# Patient Record
Sex: FEMALE | Race: WHITE | NOT HISPANIC OR LATINO | Employment: OTHER | URBAN - NONMETROPOLITAN AREA
[De-identification: names, ages, dates, MRNs, and addresses within clinical notes are randomized per-mention and may not be internally consistent; named-entity substitution may affect disease eponyms.]

---

## 2023-03-25 PROBLEM — M85.80 OSTEOPENIA: Status: ACTIVE | Noted: 2023-03-25

## 2023-03-25 PROBLEM — E78.5 HYPERLIPIDEMIA: Status: ACTIVE | Noted: 2023-03-25

## 2023-03-25 PROBLEM — R51.9 HEADACHE: Status: ACTIVE | Noted: 2023-03-25

## 2023-03-25 PROBLEM — E66.3 OVERWEIGHT WITH BODY MASS INDEX (BMI) OF 29 TO 29.9 IN ADULT: Status: ACTIVE | Noted: 2023-03-25

## 2023-03-25 PROBLEM — H90.3 SENSORINEURAL HEARING LOSS, ASYMMETRICAL: Status: ACTIVE | Noted: 2023-03-25

## 2023-03-25 PROBLEM — H93.292 AUDITORY DISCRIMINATION IMPAIRMENT, LEFT: Status: ACTIVE | Noted: 2023-03-25

## 2023-03-25 PROBLEM — H90.3 SENSORINEURAL HEARING LOSS (SNHL) OF BOTH EARS: Status: ACTIVE | Noted: 2023-03-25

## 2023-03-25 PROBLEM — R53.83 FATIGUE: Status: ACTIVE | Noted: 2023-03-25

## 2023-03-25 PROBLEM — R19.7 DIARRHEA: Status: ACTIVE | Noted: 2023-03-25

## 2023-03-25 PROBLEM — M17.9 DJD (DEGENERATIVE JOINT DISEASE) OF KNEE: Status: ACTIVE | Noted: 2023-03-25

## 2023-03-25 PROBLEM — I10 HYPERTENSION: Status: ACTIVE | Noted: 2023-03-25

## 2023-03-25 PROBLEM — H93.293 AUDITORY DISCRIMINATION IMPAIRMENT, BILATERAL: Status: ACTIVE | Noted: 2023-03-25

## 2023-03-25 PROBLEM — M17.12 PRIMARY OSTEOARTHRITIS OF LEFT KNEE: Status: ACTIVE | Noted: 2023-03-25

## 2023-03-25 PROBLEM — L23.7 POISON IVY: Status: ACTIVE | Noted: 2023-03-25

## 2023-03-25 PROBLEM — E03.9 HYPOTHYROIDISM (ACQUIRED): Status: ACTIVE | Noted: 2023-03-25

## 2023-03-25 PROBLEM — J02.9 SORE THROAT: Status: ACTIVE | Noted: 2023-03-25

## 2023-03-25 PROBLEM — M17.11 PRIMARY OSTEOARTHRITIS OF RIGHT KNEE: Status: ACTIVE | Noted: 2023-03-25

## 2023-03-25 PROBLEM — J30.9 ALLERGIC RHINITIS: Status: ACTIVE | Noted: 2023-03-25

## 2023-03-25 RX ORDER — ACETAMINOPHEN 500 MG
1000 TABLET ORAL
COMMUNITY
Start: 2019-09-09

## 2023-03-25 RX ORDER — CHOLECALCIFEROL (VITAMIN D3) 125 MCG
1 CAPSULE ORAL DAILY
COMMUNITY
Start: 2016-10-19

## 2023-03-25 RX ORDER — LISINOPRIL 10 MG/1
1 TABLET ORAL DAILY
COMMUNITY
Start: 2020-02-17 | End: 2023-03-30 | Stop reason: SDUPTHER

## 2023-03-25 RX ORDER — EPINEPHRINE 0.3 MG/.3ML
0.3 INJECTION SUBCUTANEOUS
COMMUNITY
Start: 2013-03-25 | End: 2023-03-30 | Stop reason: SDUPTHER

## 2023-03-25 RX ORDER — CETIRIZINE HYDROCHLORIDE 10 MG/1
1 TABLET ORAL DAILY
COMMUNITY
Start: 2018-11-10 | End: 2023-06-19

## 2023-03-25 RX ORDER — TRIAMCINOLONE ACETONIDE 1 MG/G
CREAM TOPICAL 2 TIMES DAILY
COMMUNITY
Start: 2021-06-16 | End: 2023-10-26 | Stop reason: ALTCHOICE

## 2023-03-25 RX ORDER — LEVOTHYROXINE SODIUM 50 UG/1
1 TABLET ORAL DAILY
COMMUNITY
Start: 2013-03-25 | End: 2023-03-30 | Stop reason: SDUPTHER

## 2023-03-25 RX ORDER — ATORVASTATIN CALCIUM 20 MG/1
1 TABLET, FILM COATED ORAL DAILY
COMMUNITY
End: 2023-03-30 | Stop reason: SDUPTHER

## 2023-03-30 ENCOUNTER — OFFICE VISIT (OUTPATIENT)
Dept: PRIMARY CARE | Facility: CLINIC | Age: 83
End: 2023-03-30
Payer: MEDICARE

## 2023-03-30 ENCOUNTER — LAB (OUTPATIENT)
Dept: LAB | Facility: LAB | Age: 83
End: 2023-03-30
Payer: MEDICARE

## 2023-03-30 VITALS
SYSTOLIC BLOOD PRESSURE: 138 MMHG | HEIGHT: 61 IN | DIASTOLIC BLOOD PRESSURE: 78 MMHG | RESPIRATION RATE: 14 BRPM | HEART RATE: 65 BPM | WEIGHT: 154.8 LBS | TEMPERATURE: 97.7 F | OXYGEN SATURATION: 98 % | BODY MASS INDEX: 29.23 KG/M2

## 2023-03-30 DIAGNOSIS — E66.3 OVERWEIGHT WITH BODY MASS INDEX (BMI) OF 29 TO 29.9 IN ADULT: ICD-10-CM

## 2023-03-30 DIAGNOSIS — J30.1 SEASONAL ALLERGIC RHINITIS DUE TO POLLEN: ICD-10-CM

## 2023-03-30 DIAGNOSIS — I10 PRIMARY HYPERTENSION: ICD-10-CM

## 2023-03-30 DIAGNOSIS — Z00.00 ROUTINE GENERAL MEDICAL EXAMINATION AT HEALTH CARE FACILITY: ICD-10-CM

## 2023-03-30 DIAGNOSIS — H35.3212 EXUDATIVE AGE-RELATED MACULAR DEGENERATION OF RIGHT EYE WITH INACTIVE CHOROIDAL NEOVASCULARIZATION (MULTI): ICD-10-CM

## 2023-03-30 DIAGNOSIS — E03.9 HYPOTHYROIDISM (ACQUIRED): ICD-10-CM

## 2023-03-30 DIAGNOSIS — E03.9 HYPOTHYROIDISM, UNSPECIFIED TYPE: ICD-10-CM

## 2023-03-30 DIAGNOSIS — I10 PRIMARY HYPERTENSION: Primary | ICD-10-CM

## 2023-03-30 DIAGNOSIS — E78.2 MIXED HYPERLIPIDEMIA: ICD-10-CM

## 2023-03-30 DIAGNOSIS — Z13.89 SCREENING FOR MULTIPLE CONDITIONS: ICD-10-CM

## 2023-03-30 DIAGNOSIS — M17.12 PRIMARY OSTEOARTHRITIS OF LEFT KNEE: ICD-10-CM

## 2023-03-30 PROBLEM — H35.3122 INTERMEDIATE STAGE NONEXUDATIVE AGE-RELATED MACULAR DEGENERATION OF LEFT EYE: Status: ACTIVE | Noted: 2023-03-30

## 2023-03-30 PROBLEM — R51.9 HEADACHE: Status: RESOLVED | Noted: 2023-03-25 | Resolved: 2023-03-30

## 2023-03-30 PROBLEM — J02.9 SORE THROAT: Status: RESOLVED | Noted: 2023-03-25 | Resolved: 2023-03-30

## 2023-03-30 PROBLEM — L23.7 POISON IVY: Status: RESOLVED | Noted: 2023-03-25 | Resolved: 2023-03-30

## 2023-03-30 PROBLEM — R19.7 DIARRHEA: Status: RESOLVED | Noted: 2023-03-25 | Resolved: 2023-03-30

## 2023-03-30 PROBLEM — R53.83 FATIGUE: Status: RESOLVED | Noted: 2023-03-25 | Resolved: 2023-03-30

## 2023-03-30 LAB
ALANINE AMINOTRANSFERASE (SGPT) (U/L) IN SER/PLAS: 14 U/L (ref 7–45)
ALBUMIN (G/DL) IN SER/PLAS: 4.5 G/DL (ref 3.4–5)
ALKALINE PHOSPHATASE (U/L) IN SER/PLAS: 137 U/L (ref 33–136)
ANION GAP IN SER/PLAS: 13 MMOL/L (ref 10–20)
ASPARTATE AMINOTRANSFERASE (SGOT) (U/L) IN SER/PLAS: 16 U/L (ref 9–39)
BASOPHILS (10*3/UL) IN BLOOD BY AUTOMATED COUNT: 0.09 X10E9/L (ref 0–0.1)
BASOPHILS/100 LEUKOCYTES IN BLOOD BY AUTOMATED COUNT: 1.6 % (ref 0–2)
BILIRUBIN TOTAL (MG/DL) IN SER/PLAS: 0.7 MG/DL (ref 0–1.2)
CALCIUM (MG/DL) IN SER/PLAS: 9.6 MG/DL (ref 8.6–10.6)
CARBON DIOXIDE, TOTAL (MMOL/L) IN SER/PLAS: 28 MMOL/L (ref 21–32)
CHLORIDE (MMOL/L) IN SER/PLAS: 106 MMOL/L (ref 98–107)
CHOLESTEROL (MG/DL) IN SER/PLAS: 184 MG/DL (ref 0–199)
CHOLESTEROL IN HDL (MG/DL) IN SER/PLAS: 62.1 MG/DL
CHOLESTEROL/HDL RATIO: 3
CREATININE (MG/DL) IN SER/PLAS: 0.81 MG/DL (ref 0.5–1.05)
EOSINOPHILS (10*3/UL) IN BLOOD BY AUTOMATED COUNT: 0.31 X10E9/L (ref 0–0.4)
EOSINOPHILS/100 LEUKOCYTES IN BLOOD BY AUTOMATED COUNT: 5.6 % (ref 0–6)
ERYTHROCYTE DISTRIBUTION WIDTH (RATIO) BY AUTOMATED COUNT: 12.8 % (ref 11.5–14.5)
ERYTHROCYTE MEAN CORPUSCULAR HEMOGLOBIN CONCENTRATION (G/DL) BY AUTOMATED: 32.4 G/DL (ref 32–36)
ERYTHROCYTE MEAN CORPUSCULAR VOLUME (FL) BY AUTOMATED COUNT: 95 FL (ref 80–100)
ERYTHROCYTES (10*6/UL) IN BLOOD BY AUTOMATED COUNT: 4.79 X10E12/L (ref 4–5.2)
GFR FEMALE: 72 ML/MIN/1.73M2
GLUCOSE (MG/DL) IN SER/PLAS: 91 MG/DL (ref 74–99)
HEMATOCRIT (%) IN BLOOD BY AUTOMATED COUNT: 45.4 % (ref 36–46)
HEMOGLOBIN (G/DL) IN BLOOD: 14.7 G/DL (ref 12–16)
IMMATURE GRANULOCYTES/100 LEUKOCYTES IN BLOOD BY AUTOMATED COUNT: 0.2 % (ref 0–0.9)
LDL: 99 MG/DL (ref 0–99)
LEUKOCYTES (10*3/UL) IN BLOOD BY AUTOMATED COUNT: 5.5 X10E9/L (ref 4.4–11.3)
LYMPHOCYTES (10*3/UL) IN BLOOD BY AUTOMATED COUNT: 1.53 X10E9/L (ref 0.8–3)
LYMPHOCYTES/100 LEUKOCYTES IN BLOOD BY AUTOMATED COUNT: 27.8 % (ref 13–44)
MONOCYTES (10*3/UL) IN BLOOD BY AUTOMATED COUNT: 0.52 X10E9/L (ref 0.05–0.8)
MONOCYTES/100 LEUKOCYTES IN BLOOD BY AUTOMATED COUNT: 9.5 % (ref 2–10)
NEUTROPHILS (10*3/UL) IN BLOOD BY AUTOMATED COUNT: 3.04 X10E9/L (ref 1.6–5.5)
NEUTROPHILS/100 LEUKOCYTES IN BLOOD BY AUTOMATED COUNT: 55.3 % (ref 40–80)
NRBC (PER 100 WBCS) BY AUTOMATED COUNT: 0 /100 WBC (ref 0–0)
PLATELETS (10*3/UL) IN BLOOD AUTOMATED COUNT: 199 X10E9/L (ref 150–450)
POTASSIUM (MMOL/L) IN SER/PLAS: 4.1 MMOL/L (ref 3.5–5.3)
PROTEIN TOTAL: 6.7 G/DL (ref 6.4–8.2)
SODIUM (MMOL/L) IN SER/PLAS: 143 MMOL/L (ref 136–145)
THYROTROPIN (MIU/L) IN SER/PLAS BY DETECTION LIMIT <= 0.05 MIU/L: 3.29 MIU/L (ref 0.44–3.98)
TRIGLYCERIDE (MG/DL) IN SER/PLAS: 116 MG/DL (ref 0–149)
UREA NITROGEN (MG/DL) IN SER/PLAS: 20 MG/DL (ref 6–23)
VLDL: 23 MG/DL (ref 0–40)

## 2023-03-30 PROCEDURE — 1160F RVW MEDS BY RX/DR IN RCRD: CPT | Performed by: FAMILY MEDICINE

## 2023-03-30 PROCEDURE — 3078F DIAST BP <80 MM HG: CPT | Performed by: FAMILY MEDICINE

## 2023-03-30 PROCEDURE — G0444 DEPRESSION SCREEN ANNUAL: HCPCS | Performed by: FAMILY MEDICINE

## 2023-03-30 PROCEDURE — 1170F FXNL STATUS ASSESSED: CPT | Performed by: FAMILY MEDICINE

## 2023-03-30 PROCEDURE — 84443 ASSAY THYROID STIM HORMONE: CPT

## 2023-03-30 PROCEDURE — 36415 COLL VENOUS BLD VENIPUNCTURE: CPT

## 2023-03-30 PROCEDURE — 1036F TOBACCO NON-USER: CPT | Performed by: FAMILY MEDICINE

## 2023-03-30 PROCEDURE — 3075F SYST BP GE 130 - 139MM HG: CPT | Performed by: FAMILY MEDICINE

## 2023-03-30 PROCEDURE — G0439 PPPS, SUBSEQ VISIT: HCPCS | Performed by: FAMILY MEDICINE

## 2023-03-30 PROCEDURE — 80061 LIPID PANEL: CPT

## 2023-03-30 PROCEDURE — 85025 COMPLETE CBC W/AUTO DIFF WBC: CPT

## 2023-03-30 PROCEDURE — 80053 COMPREHEN METABOLIC PANEL: CPT

## 2023-03-30 PROCEDURE — 1159F MED LIST DOCD IN RCRD: CPT | Performed by: FAMILY MEDICINE

## 2023-03-30 RX ORDER — ATORVASTATIN CALCIUM 20 MG/1
20 TABLET, FILM COATED ORAL DAILY
Qty: 90 TABLET | Refills: 3 | Status: SHIPPED | OUTPATIENT
Start: 2023-03-30 | End: 2024-02-26

## 2023-03-30 RX ORDER — LISINOPRIL 10 MG/1
10 TABLET ORAL DAILY
Qty: 90 TABLET | Refills: 3 | Status: SHIPPED | OUTPATIENT
Start: 2023-03-30 | End: 2024-05-20

## 2023-03-30 RX ORDER — EPINEPHRINE 0.3 MG/.3ML
0.3 INJECTION SUBCUTANEOUS AS NEEDED
Qty: 1 EACH | Refills: 3 | Status: SHIPPED | OUTPATIENT
Start: 2023-03-30

## 2023-03-30 RX ORDER — LEVOTHYROXINE SODIUM 50 UG/1
50 TABLET ORAL DAILY
Qty: 90 TABLET | Refills: 3 | Status: SHIPPED | OUTPATIENT
Start: 2023-03-30 | End: 2024-04-18

## 2023-03-30 ASSESSMENT — PATIENT HEALTH QUESTIONNAIRE - PHQ9
SUM OF ALL RESPONSES TO PHQ9 QUESTIONS 1 AND 2: 0
1. LITTLE INTEREST OR PLEASURE IN DOING THINGS: NOT AT ALL
2. FEELING DOWN, DEPRESSED OR HOPELESS: NOT AT ALL

## 2023-03-30 ASSESSMENT — ACTIVITIES OF DAILY LIVING (ADL)
DOING_HOUSEWORK: INDEPENDENT
GROCERY_SHOPPING: INDEPENDENT
DRESSING: INDEPENDENT
MANAGING_FINANCES: INDEPENDENT
BATHING: INDEPENDENT
TAKING_MEDICATION: INDEPENDENT

## 2023-03-30 NOTE — ASSESSMENT & PLAN NOTE
Has hearing aids but not function to desired   Can hear low frequency voices ok  Informed not candidate for other options of hearing improvement (surgery, etc)

## 2023-03-30 NOTE — ASSESSMENT & PLAN NOTE
History of left knee replacement in 2019.  Apparently material of knee replacement was recalled.  Chronic pain has returned which is causing functional restrictions.  Recommended to return to Dr. Aponte's office to discuss knee joint recall and possible therapeutic options.

## 2023-03-30 NOTE — ASSESSMENT & PLAN NOTE
Stable  Continue Zyrtec.   [Change in Activity] : change in activity [Nl] : Musculoskeletal [No Acute Changes] : No acute changes since previous visit

## 2023-03-30 NOTE — ASSESSMENT & PLAN NOTE
Body mass index is 29.73 kg/m².  Ideal weight is BMI 25 or under.  Think of healthy lifestyle changes rather than a diet  Weight loss is 75% diet and 25% exercise   Think of daily plate that includes 50% vegetables and do not count peas, corn, white potatoes as a vegetable. 25% complex grains/starch, 25% protein/fat.  Ideal diet is predominately plant based - Vegetables and Fruit. Protein from non meat sources ideal (whole beans, chickpeas). If choosing meat source for protein - first choice is fish in omega-3 such as [place]. Next choice is skinless poultry and last choice and infrequent should be red meat.  Weight loss can be helped through mindful eating. There are apps that can be used to assist with keeping track of your food water and exercise, such as My fitness pal Can see nutritionist if preferred.   Losing 4-6 lbs a month is a sufficient means of gradually losing and maintaining weight loss (good healthy, long term weight loss).

## 2023-03-30 NOTE — PROGRESS NOTES
"Subjective   Reason for Visit: Deepa Harrell is an 82 y.o. female here for a Medicare Wellness visit.          Reviewed all medications by prescribing practitioner or clinical pharmacist (such as prescriptions, OTCs, herbal therapies and supplements) and documented in the medical record.    HPI    Patient Care Team:  Saji Castro MD as PCP - General  Saji Castro MD as PCP - [facility] P[facility]     Review of Systems    Objective   Vitals:  /78 (BP Location: Right arm, Patient Position: Sitting, BP Cuff Size: Adult)   Pulse 65   Temp 36.5 °C (97.7 °F) (Temporal)   Resp 14   Ht 1.537 m (5' 0.5\")   Wt 70.2 kg (154 lb 12.8 oz)   LMP  (LMP Unknown)   SpO2 98%   BMI 29.73 kg/m²       Physical Exam    Assessment/Plan   Problem List Items Addressed This Visit          Circulatory    Primary hypertension - Primary    Current Assessment & Plan     Stable.  Blood pressure in office today was   BP Readings from Last 1 Encounters:   03/30/23 138/78   The goal range for blood pressure is below 130/80.   We will continue to monitor.  Continue lisinopril 10 mg daily.         Relevant Medications    lisinopril 10 mg tablet    Other Relevant Orders    Comprehensive Metabolic Panel    CBC and Auto Differential       Musculoskeletal    Primary osteoarthritis of left knee    Current Assessment & Plan     History of left knee replacement in 2019.  Chronic pain has returned which is causing functional restrictions.  Recommended to return to Dr. Aponte's office to discuss knee joint recall and possible therapeutic options.            Endocrine/Metabolic    Hypothyroidism (acquired)    Current Assessment & Plan     Stable  Continue Synthroid 50 mcg daily.         Relevant Medications    levothyroxine (Synthroid, Levoxyl) 50 mcg tablet    Other Relevant Orders    CBC and Auto Differential    Overweight with body mass index (BMI) of 29 to 29.9 in adult    Current Assessment & Plan     Body mass index is 29.73 " kg/m².  Ideal weight is BMI 25 or under.  Think of healthy lifestyle changes rather than a diet  Weight loss is 75% diet and 25% exercise   Think of daily plate that includes 50% vegetables and do not count peas, corn, white potatoes as a vegetable. 25% complex grains/starch, 25% protein/fat.  Ideal diet is predominately plant based - Vegetables and Fruit. Protein from non meat sources ideal (whole beans, chickpeas). If choosing meat source for protein - first choice is fish in omega-3 such as [place]. Next choice is skinless poultry and last choice and infrequent should be red meat.  Weight loss can be helped through mindful eating. There are apps that can be used to assist with keeping track of your food water and exercise, such as My fitness pal Can see nutritionist if preferred.   Losing 4-6 lbs a month is a sufficient means of gradually losing and maintaining weight loss (good healthy, long term weight loss).             Other    Seasonal allergic rhinitis due to pollen    Current Assessment & Plan     Stable  Continue Zyrtec.         Relevant Medications    EPINEPHrine 0.3 mg/0.3 mL injection syringe    Mixed hyperlipidemia    Current Assessment & Plan     Stable.  Continue atorvastatin 20 mg daily and fish oil supplement.  Ordered lipid panel.         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 Meyers.  Receiving injections for treatment.          Other Visit Diagnoses       Hypothyroidism, unspecified type        Relevant Orders    TSH with reflex to Free T4 if abnormal    Screening for multiple conditions        Routine general medical examination at health care facility        Relevant Orders    1 Year Follow Up In [facility] - PCP - Wellness Exam

## 2023-03-30 NOTE — ASSESSMENT & PLAN NOTE
Stable.  Blood pressure in office today was   BP Readings from Last 1 Encounters:   03/30/23 138/78     The goal range for blood pressure is below 130/80.   We will continue to monitor.  Continue lisinopril 10 mg daily.

## 2023-03-30 NOTE — PROGRESS NOTES
"Subjective   Reason for Visit: Deepa Harrell is an 82 y.o. female here for a Medicare Wellness visit.      .High blood pressure evaluation.     Review of symptoms:  Fatigue: N  Headaches: N  Blurred vision n:   Palpitations: N  Chest pains: N   Shortness of Breath: N  Swelling of legs: N  Blood in urine: N   Taking medications daily: Y  Medication side effects: N  Problems with medication compliance:N  Baby Aspirin 81 mg daily: N    High cholesterol evaluation.     Supplements: Y   specific diet plan: N   exercising 30 min daily?: N    Fatigue:N  Chest pains:N  Shortness of Breath:N  Sudden Headaches: N  Vision loss: N   Syncope (fainting): N   Leg Claudication (limping/leg tiredness):  Taking medication daily: Y   Medication side effects:N                Reviewed all medications by prescribing practitioner or clinical pharmacist (such as prescriptions, OTCs, herbal therapies and supplements) and documented in the medical record.    HPI  Patient follows with Dr. Meyers for macular degeneration. 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 She has been receiving injections for the right eye for 12 weeks.    Patient c/o left knee pain. It is painful while walking up stairs. She has history of left knee arthroplasty with DR. Aponte circa 4 years ago. Pain was initially improved after surgery. She states her artificial knee joint was recalled but she has not returned to have it evaluated. She treats pain with Tylenol before bed or after long periods of walking.    Patient c/o bilateral hearing loss. She states it is difficult to understand certain voices; it sounds like \"people are mumbling\". Brain MRI was negative.     Patient had bout of diarrhea last fall. After switching supplements the diarrhea improved.    Patient Care Team:  Saji Castro MD as PCP - General  Saji Castro MD as PCP - [employer] PCP     Review of Systems  constitutional: as per " "HPI  eyes:as per HPI  CV:as per HPI  Respiratory:as per HPI  GI:as per HPI  neuro:as per HPI  endo:as per HPI  heme/lymph:as per HPI     Objective   Vitals:  /78 (BP Location: Right arm, Patient Position: Sitting, BP Cuff Size: Adult)   Pulse 65   Temp 36.5 °C (97.7 °F) (Temporal)   Resp 14   Ht 1.537 m (5' 0.5\")   Wt 70.2 kg (154 lb 12.8 oz)   LMP  (LMP Unknown)   SpO2 98%   BMI 29.73 kg/m²       Physical Exam  Constitutional:       Appearance: Normal appearance. She is overweight.   HENT:      Head: Normocephalic.      Right Ear: Tympanic membrane, ear canal and external ear normal.      Left Ear: Tympanic membrane, ear canal and external ear normal.   Eyes:      Conjunctiva/sclera: Conjunctivae normal.      Pupils: Pupils are equal, round, and reactive to light.   Cardiovascular:      Rate and Rhythm: Normal rate and regular rhythm.   Pulmonary:      Effort: Pulmonary effort is normal.      Breath sounds: Normal breath sounds.   Abdominal:      General: Abdomen is flat. Bowel sounds are normal.      Palpations: Abdomen is soft.   Musculoskeletal:         General: Normal range of motion.   Skin:     General: Skin is warm.   Neurological:      Mental Status: She is alert and oriented to person, place, and time.   Psychiatric:         Mood and Affect: Mood normal.         Behavior: Behavior normal.         Thought Content: Thought content normal.         Judgment: Judgment normal.         Assessment/Plan   Problem List Items Addressed This Visit          Circulatory    Primary hypertension - Primary    Current Assessment & Plan     Stable.  Blood pressure in office today was   BP Readings from Last 1 Encounters:   03/30/23 138/78   The goal range for blood pressure is below 130/80.   We will continue to monitor.  Continue lisinopril 10 mg daily.         Relevant Medications    lisinopril 10 mg tablet    Other Relevant Orders    Comprehensive Metabolic Panel    CBC and Auto Differential       " Musculoskeletal    Primary osteoarthritis of left knee    Current Assessment & Plan     History of left knee replacement in 2019.  Apparently material of knee replacement was recalled.  Chronic pain has returned which is causing functional restrictions.  Recommended to return to Dr. Aponte's office to discuss knee joint recall and possible therapeutic options.            Endocrine/Metabolic    Hypothyroidism (acquired)    Current Assessment & Plan     Stable  Continue Synthroid 50 mcg daily.         Relevant Medications    levothyroxine (Synthroid, Levoxyl) 50 mcg tablet    Other Relevant Orders    CBC and Auto Differential    Overweight with body mass index (BMI) of 29 to 29.9 in adult    Current Assessment & Plan     Body mass index is 29.73 kg/m².  Ideal weight is BMI 25 or under.  Think of healthy lifestyle changes rather than a diet  Weight loss is 75% diet and 25% exercise   Think of daily plate that includes 50% vegetables and do not count peas, corn, white potatoes as a vegetable. 25% complex grains/starch, 25% protein/fat.  Ideal diet is predominately plant based - Vegetables and Fruit. Protein from non meat sources ideal (whole beans, chickpeas). If choosing meat source for protein - first choice is fish in omega-3 such as [place]. Next choice is skinless poultry and last choice and infrequent should be red meat.  Weight loss can be helped through mindful eating. There are apps that can be used to assist with keeping track of your food water and exercise, such as My fitness pal Can see nutritionist if preferred.   Losing 4-6 lbs a month is a sufficient means of gradually losing and maintaining weight loss (good healthy, long term weight loss).             Other    Seasonal allergic rhinitis due to pollen    Current Assessment & Plan     Stable  Continue Zyrtec.         Relevant Medications    EPINEPHrine 0.3 mg/0.3 mL injection syringe    Mixed hyperlipidemia    Current Assessment & Plan      Stable.  Continue atorvastatin 20 mg daily and fish oil supplement.  Ordered lipid panel.         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 Meyers.  Receiving injections for treatment.          Other Visit Diagnoses       Hypothyroidism, unspecified type        Relevant Orders    TSH with reflex to Free T4 if abnormal    Screening for multiple conditions        Routine general medical examination at health care facility        Relevant Orders    1 Year Follow Up In [facility] - PCP - Wellness Exam           By signing my name below I, ifeoma Hollingsworth, attest that this documentation has been prepared under the direction and in the presence of Dr. Saji Castro. 03/30/23

## 2023-04-12 DIAGNOSIS — N30.00 ACUTE CYSTITIS WITHOUT HEMATURIA: Primary | ICD-10-CM

## 2023-04-12 RX ORDER — CIPROFLOXACIN 250 MG/1
250 TABLET, FILM COATED ORAL 2 TIMES DAILY
Qty: 10 TABLET | Refills: 0 | Status: SHIPPED | OUTPATIENT
Start: 2023-04-12 | End: 2024-04-12 | Stop reason: SDUPTHER

## 2023-06-19 DIAGNOSIS — J30.9 ALLERGIC RHINITIS, UNSPECIFIED: ICD-10-CM

## 2023-06-19 RX ORDER — CETIRIZINE HYDROCHLORIDE 10 MG/1
TABLET ORAL
Qty: 90 TABLET | Refills: 3 | Status: SHIPPED | OUTPATIENT
Start: 2023-06-19

## 2023-09-07 ENCOUNTER — TELEPHONE (OUTPATIENT)
Dept: PRIMARY CARE | Facility: CLINIC | Age: 83
End: 2023-09-07
Payer: MEDICARE

## 2023-09-07 DIAGNOSIS — C44.41 BASAL CELL CARCINOMA (BCC) OF SCALP: Primary | ICD-10-CM

## 2023-09-07 NOTE — TELEPHONE ENCOUNTER
Can you assist with this referral,  Deepa Harrell is a patient of Dr. Castro but she is my mother in law.  She has a lesion on her scalp at the same site where she had previous basal cell cancer removed.  It would be ok if she saw one of the NP's in the Derm practice with Dr. Lewis,  thanks

## 2023-10-26 ENCOUNTER — PROCEDURE VISIT (OUTPATIENT)
Dept: DERMATOLOGY | Facility: CLINIC | Age: 83
End: 2023-10-26
Payer: MEDICARE

## 2023-10-26 DIAGNOSIS — C44.519 BASAL CELL CARCINOMA (BCC) OF SKIN OF OTHER PART OF TORSO: ICD-10-CM

## 2023-10-26 PROCEDURE — 88305 TISSUE EXAM BY PATHOLOGIST: CPT | Performed by: DERMATOLOGY

## 2023-10-26 PROCEDURE — 11604 EXC TR-EXT MAL+MARG 3.1-4 CM: CPT | Performed by: STUDENT IN AN ORGANIZED HEALTH CARE EDUCATION/TRAINING PROGRAM

## 2023-10-26 PROCEDURE — 88305 TISSUE EXAM BY PATHOLOGIST: CPT | Mod: TC,DER | Performed by: STUDENT IN AN ORGANIZED HEALTH CARE EDUCATION/TRAINING PROGRAM

## 2023-10-26 NOTE — PROGRESS NOTES
Excision Operative Note    Date of Surgery:  10/26/2023  Surgeon:  Baljit Acuna MD  Office Location: [facility] [address] [address]  Dept: [phone]  Dept Fax: [phone]  Referring Provider: Keesha Lewis MD  [address] [place] [id number]    Subjective   Deepa Harrell is a 83 y.o. female who presents for the following: Excision for basal cell carcinoma.    According to the patient, the lesion has been present for approximately 6 months at the time of diagnosis.  The lesion is painful.  The lesion has not been treated previously.    The patient does not have a pacemaker / defibrillator.    The patient is not on blood thinners.   The patient does not have a history of hepatitis B or C.  The patient does not have a history of HIV.    The following portions of the chart were reviewed this encounter and updated as appropriate:         Assessment/Plan   Pre-procedure:   Obtained informed consent: written from patient  The surgical site was identified and confirmed with the patient.     Intra-operative:   Audible time out called at : 3:22 PM 10/26/23  by: Janette Judge RN   Verified patient name, birthdate, site, specimen bottle label & requisition.    The planned procedure(s) was again reviewed with the patient. The risks of bleeding, infection, nerve damage and scarring were reviewed. The patient identity, surgical site, and planned procedure(s) were verified.     Basal cell carcinoma (BCC) of skin of other part of torso  Right  lateral Upper Back    Skin excision    Lesion length (cm):  3.2  Lesion width (cm):  2.2  Margin per side (cm):  0.4  Total excision diameter (cm):  4  Informed consent: discussed and consent obtained    Timeout: patient name, date of birth, surgical site, and procedure verified    Procedure prep:  Patient was prepped and draped  Anesthesia: the lesion was anesthetized in a standard fashion     Anesthetic:  Lidocaine 2% with epinephrine  Instrument used: #15 blade    Hemostasis achieved with: electrodesiccation    Outcome: patient tolerated procedure well with no complications    Post-procedure details: sterile dressing applied and wound care instructions given    Dressing type: pressure dressing    Additional details:  The nature of the diagnosis was explained. The lesion is a skin cancer.  It is locally aggressive but has a very low to non-existent risk of spreading.  The condition is associated with sun exposure.  Warning signs of non-melanoma skin cancer discussed. Patient was instructed to perform monthly self skin examination.  We recommended that the patient have regular full skin exams given an increased risk of subsequent skin cancers. The patient was instructed to use sun protective behaviors including use of broad spectrum sunscreens and sun protective clothing to reduce risk of skin cancers.  Excision was discussed with the patient. The risks, benefits and potential adverse effects were reviewed. Discussion included but was not limited to the cure rate, relative cost, wound care requirements, activity restrictions, likely scar outcome and time to heal were reviewed. It was explained that the scar would be longer than the original lesion.  The patient elected to proceed with exicision today.    Staff Communication: Dermatology Local Anesthesia: 2 % Lidocaine / Epinephrine - Amount: 10cc    Specimen 1 - Dermatopathology- DERM LAB  Differential Diagnosis: excision BCC  Check Margins Yes/No?:  yes  Comments:  12 o'clock superior  Dermpath Lab: Routine Histopathology (formalin-fixed tissue)    The final defect measured 4.0 x 3.0 cm    Repair: After a discussion with the patient regarding the options for wound closure, a decision was made to proceed with second intention healing.  Dressing F/U: Surgifoam was placed in the wound. A pressure dressing was placed to help stabilize the wound and to  minimize the risk of postoperative bleeding. Wound care was discussed, and the patient was given written post-operative wound care instructions.                      Wound care was discussed, and the patient was given written post-operative wound care instructions.      The patient will follow up with Baljit Acuna MD in 6 weeks, and will follow up with their primary dermatologist as scheduled.

## 2023-10-31 LAB
LABORATORY COMMENT REPORT: NORMAL
PATH REPORT.FINAL DX SPEC: NORMAL
PATH REPORT.GROSS SPEC: NORMAL
PATH REPORT.MICROSCOPIC SPEC OTHER STN: NORMAL
PATH REPORT.RELEVANT HX SPEC: NORMAL
PATH REPORT.TOTAL CANCER: NORMAL

## 2023-11-03 NOTE — RESULT ENCOUNTER NOTE
Surgical site: R lateral upper back  Date of procedure 10/26/23    A voicemail was left for the patient about the pathology result which showed clear margins. No further surgery needed but the patient was advised to follow up with general dermatology. The patient was advised to call with any questions.

## 2023-12-14 ENCOUNTER — OFFICE VISIT (OUTPATIENT)
Dept: DERMATOLOGY | Facility: CLINIC | Age: 83
End: 2023-12-14
Payer: MEDICARE

## 2023-12-14 VITALS — SYSTOLIC BLOOD PRESSURE: 140 MMHG | DIASTOLIC BLOOD PRESSURE: 71 MMHG | HEART RATE: 74 BPM

## 2023-12-14 DIAGNOSIS — C44.619 BASAL CELL CARCINOMA OF LEFT HAND: ICD-10-CM

## 2023-12-14 PROCEDURE — 17311 MOHS 1 STAGE H/N/HF/G: CPT | Performed by: STUDENT IN AN ORGANIZED HEALTH CARE EDUCATION/TRAINING PROGRAM

## 2023-12-14 PROCEDURE — 3077F SYST BP >= 140 MM HG: CPT | Performed by: STUDENT IN AN ORGANIZED HEALTH CARE EDUCATION/TRAINING PROGRAM

## 2023-12-14 PROCEDURE — 3078F DIAST BP <80 MM HG: CPT | Performed by: STUDENT IN AN ORGANIZED HEALTH CARE EDUCATION/TRAINING PROGRAM

## 2023-12-14 PROCEDURE — 1159F MED LIST DOCD IN RCRD: CPT | Performed by: STUDENT IN AN ORGANIZED HEALTH CARE EDUCATION/TRAINING PROGRAM

## 2023-12-14 PROCEDURE — 1036F TOBACCO NON-USER: CPT | Performed by: STUDENT IN AN ORGANIZED HEALTH CARE EDUCATION/TRAINING PROGRAM

## 2023-12-14 PROCEDURE — 1160F RVW MEDS BY RX/DR IN RCRD: CPT | Performed by: STUDENT IN AN ORGANIZED HEALTH CARE EDUCATION/TRAINING PROGRAM

## 2023-12-14 NOTE — PROGRESS NOTES
Mohs Surgery Operative Note    Date of Surgery:  12/14/2023  Surgeon:  Baljit Acuna MD  Office Location: [facility] [address] [address]  Dept: [phone]  Dept Fax: [phone]  Referring Provider: Keesha Lewis MD  [address] [place] [id number]      Assessment/Plan   Pre-procedure:   Obtained informed consent: written from patient  The surgical site was identified and confirmed with the patient.     Intra-operative:   Audible time out called at : 9:10 AM 12/14/23  by: Nichelle Rowan RN   Verified patient name, birthdate, site, specimen bottle label & requisition.    The planned procedure(s) was again reviewed with the patient. The risks of bleeding, infection, nerve damage and scarring were reviewed. Written authorization was obtained. The patient identity, surgical site, and planned procedure(s) were verified. The provider acted as both surgeon and pathologist.     Basal cell carcinoma of left hand  Left Dorsal Hand    Mohs surgery    Consent obtained: written    Universal Protocol:  Procedure explained and questions answered to patient or proxy's satisfaction: Yes    Test results available and properly labeled: Yes    Pathology report reviewed: Yes    External notes reviewed: Yes    Photo or diagram used for site identification: Yes    Site/side marked: Yes    Slide independently reviewed by Mohs surgeon: Yes    Immediately prior to procedure a time out was called: Yes    Patient identity confirmed: verbally with patient  Preparation: Patient was prepped and draped in usual sterile fashion      Anticoagulation:  Is the patient taking prescription anticoagulant and/or aspirin prescribed/recommended by a physician? No    Was the anticoagulation regimen changed prior to Mohs? No      Anesthesia:  Anesthesia method: local infiltration  Local anesthetic: lidocaine 1% WITH epi    Procedure Details:  Biopsy accession number:  [id number]  Date of biopsy: 9/13/2023  Pre-Op diagnosis: basal cell carcinoma  BCC subtype: nodular  Surgery side: left  Surgical site (from skin exam): Left Dorsal Hand  Pre-operative length (cm): 0.9  Pre-operative width (cm): 0.8  Indications for Mohs surgery: anatomic location where tissue conservation is critical    Micrographic Surgery Details:  Post-operative length (cm): 0.9  Post-operative width (cm): 0.8  Number of Mohs stages: 1    Stage 1     Comments: The patient was brought into the operating room and placed in the procedure chair in the appropriate position.  The area positive by previous biopsy was identified and confirmed with the patient. The area of clinically obvious tumor was debulked using a curette and/or scalpel as needed. An incision was made following the Mohs approach through the skin. The specimen was taken to the lab, divided into 2 piece(s) and appropriately chromacoded and processed.     Tumor features identified on Mohs section: no tumor identified    Depth of defect: subcutaneous fat    Patient tolerance of procedure: tolerated well, no immediate complications    Reconstruction:  Was the defect reconstructed?: No      Staff Communication: Dermatology Local Anesthesia: 1 % Lidocaine / Epinephrine - Amount: 3 ml        Repair: After a discussion with the patient regarding the options for wound closure, a decision was made to proceed with second intention healing.  Dressing F/U: Surgifoam was placed in the wound. A pressure dressing was placed to help stabilize the wound and to minimize the risk of postoperative bleeding. Wound care was discussed, and the patient was given written post-operative wound care instructions.    The final defect measured 0.9 x 0.8 cm    Wound care was discussed, and the patient was given written post-operative wound care instructions.      The patient will follow up with Baljit Acuna MD as needed for any post operative problems or concerns, and will follow up  with their primary dermatologist as scheduled.

## 2023-12-14 NOTE — PROGRESS NOTES
Office Visit Note  Date: 12/14/2023  Surgeon:  Baljit Acuna MD  Office Location: [facility] [address] [address]  Dept: [phone]  Dept Fax: [phone]  Referring Provider: Keesha Lewis MD  [address] [place] [address]    Subjective   Deepa Harrell is a 83 y.o. female who presents for the following: MOHS Surgery    According to the patient, the lesion has been presnt for an unknown amount of time at the time of diagnosis.  The lesion is  not causing symptoms.  The lesion has not been treated previously.    The patient does not have a pacemaker / defibrillator.  The patient does have a heart valve / joint replacement (L knee 2019).    The patient is not on blood thinners.  The patient does not have a history of hepatitis B or C.  The patient does not have a history of HIV.  The patient does not have a history of immunosuppression (e.g. organ transplantation, malignancy, medications)    Review of Systems:  No other skin or systemic complaints other than what is documented elsewhere in the note.    MEDICAL HISTORY: clinically relevant history including significant past medical history, medications and allergies was reviewed and documented in Epic.    Objective   Well appearing patient in no apparent distress; mood and affect are within normal limits.  Vital signs: See record.  Noted on the Left Dorsal Hand  Is a 0.9 x 0.8 cm scar                  The patient confirmed the identified site.    Discussion:  The nature of the diagnosis was explained. The lesion is a skin cancer.  It has a risk of local growth and distant spread. The condition is associated with sun exposure.  Warning signs of non-melanoma skin cancer discussed. Patient was instructed to perform monthly self skin examination.  We recommended that the patient have regular full skin exams given an increased risk of subsequent skin cancers. The patient was instructed to  use sun protective behaviors including use of broad spectrum sunscreens and sun protective clothing to reduce risk of skin cancers.      Risks, benefits, side effects of Mohs surgery were discussed with patient and the patient voiced understanding.  It was explained that even though the cure rate of Mohs is very high it is not 100%. Risks of surgery including but not limited to bleeding, infection, numbness, nerve damage, and scar were reviewed.  Discussion included wound care requirements, activity restrictions, likely scar outcome and time to heal.     After Mohs surgery, the defect may need to be repaired surgically and the scar may be longer than the original lesion.  Reconstruction options, risks, and benefits were reviewed including second intention healing, linear repair (4-1 ratio was explained), local flaps, skin grafts, cartilage grafts and interpolation flaps (the need for multiple surgeries was explained). Possible outcomes were reviewed including likely scar appearance, failure of flap survival, infection, bleeding and the need for revision surgery.     The pathology was reviewed, the photograph was reviewed, and the referring physician's note was reviewed.    Patient elected for Mohs surgery.

## 2024-02-08 ENCOUNTER — CLINICAL SUPPORT (OUTPATIENT)
Dept: AUDIOLOGY | Facility: CLINIC | Age: 84
End: 2024-02-08

## 2024-02-08 DIAGNOSIS — H90.3 SENSORINEURAL HEARING LOSS (SNHL) OF BOTH EARS: Primary | ICD-10-CM

## 2024-02-08 NOTE — PROGRESS NOTES
[facility] AUDIOLOGY  HEARING AID CHECK      Name:  Deepa Harrell  YOB: 1940  Today's date:  02/08/24      PATIENT ISSUES/CONCERNS AND OTOSCOPIC RESULTS  Otoscopic was clear for the left ear with deep but non-occluding cerumen present for the right ear.  Patient reports that the earmolds have been slipping out of her ears.    HEARING AID INSPECTION AND ADJUSTMENT  Hearing aids were cleaned and checked.  Retubed earmolds.  Listening check was good.      Discussed options regarding earmold fit.  Took impressions and will order new earmolds from [place]. ($85/each).    Also briefly discussed scheduling a cochlear implant evaluation.  Patient declined at this time.    FOLLOW UP   I will see the patient back in 2-3 weeks for the earmold fitting.    APPOINTMENT TIME  11:00-11:30am    Melanie Simon  Doctor of Audiology  Licensed Audiologist

## 2024-02-26 DIAGNOSIS — E78.2 MIXED HYPERLIPIDEMIA: ICD-10-CM

## 2024-02-26 RX ORDER — ATORVASTATIN CALCIUM 20 MG/1
20 TABLET, FILM COATED ORAL DAILY
Qty: 90 TABLET | Refills: 3 | Status: SHIPPED | OUTPATIENT
Start: 2024-02-26

## 2024-03-01 ENCOUNTER — APPOINTMENT (OUTPATIENT)
Dept: AUDIOLOGY | Facility: CLINIC | Age: 84
End: 2024-03-01
Payer: MEDICARE

## 2024-03-21 ENCOUNTER — APPOINTMENT (OUTPATIENT)
Dept: AUDIOLOGY | Facility: CLINIC | Age: 84
End: 2024-03-21
Payer: MEDICARE

## 2024-03-27 ENCOUNTER — DOCUMENTATION (OUTPATIENT)
Dept: AUDIOLOGY | Facility: CLINIC | Age: 84
End: 2024-03-27
Payer: MEDICARE

## 2024-03-27 NOTE — PROGRESS NOTES
Ilene Ko, AuD 3/26/2024 10:39 AM EDT  Received mold.  Left message to schedule fitting.  ------------------------------------  Ilene Ko, AuD 3/20/2024 09:45 AM EDT  Called patient and relayed the information.  I will call her when I receive the earmolds to schedule an appointment.  ------------------------------------  Ilene Ko, AuD 3/20/2024 09:44 AM EDT  Patient scheduled for an earmold fitting but I do not have the earmolds.  Called and spoke to [facility] they shipped them but the tracking shows they never mad it to our office.  They are going to make a new set and ship them to me early next week.

## 2024-04-09 ENCOUNTER — OFFICE VISIT (OUTPATIENT)
Dept: PRIMARY CARE | Facility: CLINIC | Age: 84
End: 2024-04-09
Payer: MEDICARE

## 2024-04-09 ENCOUNTER — LAB (OUTPATIENT)
Dept: LAB | Facility: LAB | Age: 84
End: 2024-04-09
Payer: MEDICARE

## 2024-04-09 VITALS
RESPIRATION RATE: 14 BRPM | SYSTOLIC BLOOD PRESSURE: 138 MMHG | TEMPERATURE: 97.2 F | HEART RATE: 75 BPM | OXYGEN SATURATION: 94 % | DIASTOLIC BLOOD PRESSURE: 81 MMHG

## 2024-04-09 DIAGNOSIS — Z13.89 SCREENING FOR MULTIPLE CONDITIONS: ICD-10-CM

## 2024-04-09 DIAGNOSIS — E03.9 HYPOTHYROIDISM (ACQUIRED): ICD-10-CM

## 2024-04-09 DIAGNOSIS — M17.12 PRIMARY OSTEOARTHRITIS OF LEFT KNEE: ICD-10-CM

## 2024-04-09 DIAGNOSIS — I10 PRIMARY HYPERTENSION: ICD-10-CM

## 2024-04-09 DIAGNOSIS — Z13.1 DIABETES MELLITUS SCREENING: ICD-10-CM

## 2024-04-09 DIAGNOSIS — Z13.6 SCREENING FOR CARDIOVASCULAR CONDITION: ICD-10-CM

## 2024-04-09 DIAGNOSIS — E03.9 HYPOTHYROIDISM, UNSPECIFIED TYPE: ICD-10-CM

## 2024-04-09 DIAGNOSIS — M17.11 PRIMARY OSTEOARTHRITIS OF RIGHT KNEE: ICD-10-CM

## 2024-04-09 DIAGNOSIS — E66.3 OVERWEIGHT WITH BODY MASS INDEX (BMI) OF 29 TO 29.9 IN ADULT: ICD-10-CM

## 2024-04-09 DIAGNOSIS — M85.88 OSTEOPENIA OF LUMBAR SPINE: ICD-10-CM

## 2024-04-09 DIAGNOSIS — E78.2 MIXED HYPERLIPIDEMIA: Primary | ICD-10-CM

## 2024-04-09 DIAGNOSIS — Z00.00 ROUTINE GENERAL MEDICAL EXAMINATION AT HEALTH CARE FACILITY: ICD-10-CM

## 2024-04-09 PROCEDURE — 1036F TOBACCO NON-USER: CPT | Performed by: FAMILY MEDICINE

## 2024-04-09 PROCEDURE — 82306 VITAMIN D 25 HYDROXY: CPT

## 2024-04-09 PROCEDURE — 99214 OFFICE O/P EST MOD 30 MIN: CPT | Performed by: FAMILY MEDICINE

## 2024-04-09 PROCEDURE — 3075F SYST BP GE 130 - 139MM HG: CPT | Performed by: FAMILY MEDICINE

## 2024-04-09 PROCEDURE — G0439 PPPS, SUBSEQ VISIT: HCPCS | Performed by: FAMILY MEDICINE

## 2024-04-09 PROCEDURE — 80053 COMPREHEN METABOLIC PANEL: CPT

## 2024-04-09 PROCEDURE — 85025 COMPLETE CBC W/AUTO DIFF WBC: CPT

## 2024-04-09 PROCEDURE — 80061 LIPID PANEL: CPT

## 2024-04-09 PROCEDURE — 1170F FXNL STATUS ASSESSED: CPT | Performed by: FAMILY MEDICINE

## 2024-04-09 PROCEDURE — G0444 DEPRESSION SCREEN ANNUAL: HCPCS | Performed by: FAMILY MEDICINE

## 2024-04-09 PROCEDURE — 3079F DIAST BP 80-89 MM HG: CPT | Performed by: FAMILY MEDICINE

## 2024-04-09 PROCEDURE — 36415 COLL VENOUS BLD VENIPUNCTURE: CPT

## 2024-04-09 PROCEDURE — 1160F RVW MEDS BY RX/DR IN RCRD: CPT | Performed by: FAMILY MEDICINE

## 2024-04-09 PROCEDURE — 1159F MED LIST DOCD IN RCRD: CPT | Performed by: FAMILY MEDICINE

## 2024-04-09 PROCEDURE — 84443 ASSAY THYROID STIM HORMONE: CPT

## 2024-04-09 ASSESSMENT — ENCOUNTER SYMPTOMS
COUGH: 0
SHORTNESS OF BREATH: 0
WHEEZING: 0
RHINORRHEA: 1

## 2024-04-09 ASSESSMENT — ACTIVITIES OF DAILY LIVING (ADL)
DRESSING: INDEPENDENT
BATHING: INDEPENDENT

## 2024-04-09 ASSESSMENT — PATIENT HEALTH QUESTIONNAIRE - PHQ9
2. FEELING DOWN, DEPRESSED OR HOPELESS: NOT AT ALL
2. FEELING DOWN, DEPRESSED OR HOPELESS: NOT AT ALL
1. LITTLE INTEREST OR PLEASURE IN DOING THINGS: NOT AT ALL
SUM OF ALL RESPONSES TO PHQ9 QUESTIONS 1 AND 2: 0
1. LITTLE INTEREST OR PLEASURE IN DOING THINGS: NOT AT ALL
SUM OF ALL RESPONSES TO PHQ9 QUESTIONS 1 AND 2: 0

## 2024-04-09 NOTE — ASSESSMENT & PLAN NOTE
Continue weight bearing exercise (walk treadmill)  Dietary calcium 1500mg daily  Vit d 5000iu daily

## 2024-04-09 NOTE — ASSESSMENT & PLAN NOTE
Blood pressure in office today was   BP Readings from Last 1 Encounters:   04/09/24 138/81   The goal range for blood pressure is below 130/80.   We will continue to monitor.  Continue lisinopril 10 mg daily.

## 2024-04-09 NOTE — PROGRESS NOTES
Subjective   Reason for Visit: Deepa Harrell is an 83 y.o. female here for a Medicare Wellness visit.       Reviewed all medications by prescribing practitioner or clinical pharmacist (such as prescriptions, OTCs, herbal therapies and supplements) and documented in the medical record.     She feels like she is putting on weight, but overall her weight seems to be stable. She reported 150 pounds when she weighed herself this morning. She also walks on the treadmill at home for exercise. No breathing symptoms.   Her sense of smell and taste are diminishing. She states that it is harder to cook because everything seems bland. She never had Covid that she is aware of. She believes it may be due to constantly suffering from allergies. She is taking a generic form of Zyrtec and has tried nasal sprays in the past but is not currently using one.   She reports hearing loss in her left ear, not so much in the right. She is still receiving injections in her eyes every 8 weeks or so to help with pressure, she is unsure what her eye pressure is at currently.      Patient Care Team:  Saji Castro MD as PCP - General  Saji Castro MD as PCP - [employer] PCP  Eligio Joyner as Audiologist (Audiology)     Review of Systems   HENT:  Positive for hearing loss (left) and rhinorrhea.    Respiratory:  Negative for cough, shortness of breath and wheezing.    Allergic/Immunologic: Positive for environmental allergies.       Objective   Vitals:  /81 (BP Location: Right arm, Patient Position: Sitting, BP Cuff Size: Adult)   Pulse 75   Temp 36.2 °C (97.2 °F) (Temporal)   Resp 14   LMP  (LMP Unknown)   SpO2 94%       Physical Exam  Constitutional:       Appearance: Normal appearance.   HENT:      Head: Normocephalic.      Right Ear: Tympanic membrane normal.      Left Ear: Tympanic membrane normal. There is impacted cerumen (mild).      Nose: Nose normal.      Mouth/Throat:      Mouth: Mucous membranes are  moist.      Pharynx: Oropharynx is clear.   Eyes:      Pupils: Pupils are equal, round, and reactive to light.   Cardiovascular:      Rate and Rhythm: Normal rate and regular rhythm.      Pulses: Normal pulses.      Heart sounds: Normal heart sounds.   Pulmonary:      Effort: Pulmonary effort is normal.      Breath sounds: Normal breath sounds.   Abdominal:      General: Abdomen is flat. Bowel sounds are normal.      Palpations: Abdomen is soft.   Skin:     General: Skin is warm and dry.   Neurological:      General: No focal deficit present.      Mental Status: She is alert.   Psychiatric:         Mood and Affect: Mood normal.         Behavior: Behavior normal.         Thought Content: Thought content normal.         Judgment: Judgment normal.         Assessment/Plan   Problem List Items Addressed This Visit       Mixed hyperlipidemia - Primary    Current Assessment & Plan     Lab Results   Component Value Date    CHOL 184 03/30/2023    CHOL 166 03/23/2022    CHOL 168 02/10/2021     Lab Results   Component Value Date    HDL 62.1 03/30/2023    HDL 58.9 03/23/2022    HDL 55.7 02/10/2021     Lab Results   Component Value Date    LDLF 99 03/30/2023     Lab Results   Component Value Date    TRIG 116 03/30/2023    TRIG 121 03/23/2022    TRIG 126 02/10/2021   Stable.  Continue atorvastatin 20 mg daily and fish oil supplement.  Ordered lipid panel.         Primary hypertension    Current Assessment & Plan     Blood pressure in office today was   BP Readings from Last 1 Encounters:   04/09/24 138/81   The goal range for blood pressure is below 130/80.   We will continue to monitor.  Continue lisinopril 10 mg daily.         Hypothyroidism (acquired)    Current Assessment & Plan     Lab Results   Component Value Date    TSH 3.29 03/30/2023   Stable  Continue Synthroid 50 mcg daily.  Repeat TSH ordered.         Primary osteoarthritis of left knee    Current Assessment & Plan     History of left knee replacement in 2019.   Apparently material of knee replacement was recalled.  Chronic pain has returned which is causing functional restrictions.  Recommended to return to Dr. Aponte's office to discuss knee joint recall and possible therapeutic options.         Primary osteoarthritis of right knee       Scribe Attestation  By signing my name below, Rica OLVERA Scribe   attest that this documentation has been prepared under the direction and in the presence of Saji Castro MD.

## 2024-04-09 NOTE — ASSESSMENT & PLAN NOTE
Lab Results   Component Value Date    CHOL 184 03/30/2023    CHOL 166 03/23/2022    CHOL 168 02/10/2021     Lab Results   Component Value Date    HDL 62.1 03/30/2023    HDL 58.9 03/23/2022    HDL 55.7 02/10/2021     Lab Results   Component Value Date    LDLF 99 03/30/2023     Lab Results   Component Value Date    TRIG 116 03/30/2023    TRIG 121 03/23/2022    TRIG 126 02/10/2021   Stable.  Continue atorvastatin 20 mg daily and fish oil supplement.  Ordered lipid panel.

## 2024-04-09 NOTE — PROGRESS NOTES
Subjective   Reason for Visit: Deepa Harrell is an 83 y.o. female here for a Medicare Wellness visit.          Reviewed all medications by prescribing practitioner or clinical pharmacist (such as prescriptions, OTCs, herbal therapies and supplements) and documented in the medical record.    HPI    Patient Care Team:  Saji Castro MD as PCP - General  Saji Castro MD as PCP - [employer] PCP  Eligio Joyner as Audiologist (Audiology)     Review of Systems    Objective   Vitals:  /81 (BP Location: Right arm, Patient Position: Sitting, BP Cuff Size: Adult)   Pulse 75   Temp 36.2 °C (97.2 °F) (Temporal)   Resp 14   LMP  (LMP Unknown)   SpO2 94%       Physical Exam    Assessment/Plan   Problem List Items Addressed This Visit       Mixed hyperlipidemia - Primary    Current Assessment & Plan     Lab Results   Component Value Date    CHOL 184 03/30/2023    CHOL 166 03/23/2022    CHOL 168 02/10/2021     Lab Results   Component Value Date    HDL 62.1 03/30/2023    HDL 58.9 03/23/2022    HDL 55.7 02/10/2021     Lab Results   Component Value Date    LDLF 99 03/30/2023     Lab Results   Component Value Date    TRIG 116 03/30/2023    TRIG 121 03/23/2022    TRIG 126 02/10/2021   Stable.  Continue atorvastatin 20 mg daily and fish oil supplement.  Ordered lipid panel.         Primary hypertension    Current Assessment & Plan     Blood pressure in office today was   BP Readings from Last 1 Encounters:   04/09/24 138/81   The goal range for blood pressure is below 130/80.   We will continue to monitor.  Continue lisinopril 10 mg daily.         Relevant Orders    CBC and Auto Differential    Hypothyroidism (acquired)    Current Assessment & Plan     Lab Results   Component Value Date    TSH 3.29 03/30/2023   Stable  Continue Synthroid 50 mcg daily.  Repeat TSH ordered.         Relevant Orders    CBC and Auto Differential    Osteopenia    Overview     Dexa 2010 T LS -1.1 T spine -1.9         Current  Assessment & Plan     Continue weight bearing exercise (walk treadmill)  Dietary calcium 1500mg daily  Vit d 5000iu daily          Relevant Orders    Vitamin D 25-Hydroxy,Total (for eval of Vitamin D levels)    Primary osteoarthritis of left knee    Current Assessment & Plan     History of left knee replacement in 2019.  Apparently material of knee replacement was recalled.  Chronic pain has returned which is causing functional restrictions.  Recommended to return to Dr. Aponte's office to discuss knee joint recall and possible therapeutic options.         Primary osteoarthritis of right knee    Relevant Orders    CBC and Auto Differential    Overweight with body mass index (BMI) of 29 to 29.9 in adult     Other Visit Diagnoses       Screening for multiple conditions        Diabetes mellitus screening        Relevant Orders    Comprehensive Metabolic Panel    Screening for cardiovascular condition        Relevant Orders    Lipid panel    Routine general medical examination at health care facility        Hypothyroidism, unspecified type        Relevant Orders    TSH with reflex to Free T4 if abnormal

## 2024-04-09 NOTE — ASSESSMENT & PLAN NOTE
Patient: Salvador Dueñas Date: 10/10/2022   : 1944    78 year old male      INPATIENT WOUND CARE PROGRESS NOTE    Supervising Wound Care / Hyperbaric Medicine Physician: Not Applicable  Consulting Provider:  SO Rebolledo, SO Palomo  Date of Consultation/Last Comprehensive Exam:  22, 10/3/22  Referring  Provider:  Dr. Justen Soares, Dr. Bashir Lebron    SUBJECTIVE:    Chief Complaint:  Buttock wound     Wound/Ulcer Present:    Pressure ulcer, other sites  Non-healing surgical wound    Additional Wound Category:  None     Maximum Baseline Ambulatory Status:  Unable to assess    History of Present Illness:  This is a 78 year old male with a past medical history significant for BPH, CHF, COPD, CAD, depression, essential hypertension, GERD, PVD, CKD stage 3, current smoker. No hx of DM.     Admitted to [facility] on 7/15/22 for necrotic sacral wound. Underwent serial bedside debridements with Dr. Anthony. Wound care team was consulted for recommendations.     Interval History 10/10/2022   Patient was discharged from rehab last week and had a home care nurse that came and did his dressing change last week. He presented to the ER 10/1 with abdominal pain and groin pain and swelling. CT concerning for incarcerated hernia. Patient transferred from [place] to [facility]. Surgical team has evaluated with no plans for surgery during this admission.  Wound care has been asked to evaluate and manage patients buttocks wound. Patient notes the wound has been improving.  He denies fevers, chills, nausea or vomiting at this time.     Interval History 10/10/2022:  Patient seen for wound care follow up and dressing change  He denies fevers, chills, nausea or vomiting.   Denies pain at the site of his ulcer.  Able to offload independently    Current Treatment Regimen:  Dressing:  Medihoney   Frequency:  Three times a week   Changed by:  Home care agency nurse    Review of Systems:  Pertinent items are noted in HPI (history  History of left knee replacement in 2019.  Apparently material of knee replacement was recalled.  Chronic pain has returned which is causing functional restrictions.  Recommended to return to Dr. Aponte's office to discuss knee joint recall and possible therapeutic options.   of present illness).    Past Medical History:   Diagnosis Date   • Benign prostatic hyperplasia with lower urinary tract symptoms 2/10/2022   • Bilateral cataracts    • BPH with obstruction/lower urinary tract symptoms    • CAD (coronary artery disease)S/P PTCA with Stent unknown vessel 11/20221 in [place] 2/10/2022    CAD S/P PTCA  stent unknown vessels 11/ 2021 in [place]7/22/22 Nuc Stress test  No ischemia present. Lateral and apical Infarct present. Postexercise global LVEF31%.: Abnormal stress test with ef 31% at stress. No ischemia present but large area of Lateral, apical and possibly inferior infarct. Sev. P. HTN:7/20/22 ECHO:Severely decreased LVSF.LVEF; 31 %.Severe P. HTN; RVSP 69 mmHg.Severely increased   • Chronic idiopathic thrombocytopenia (CMS/HCC)    • Chronic idiopathic thrombocytopenia (CMS/HCC) 2/10/2022    -208K   • Chronic obstructive pulmonary disease (COPD) (CMS/HCC) 2/10/2022   • Chronic Pressure injury of back, unstageable (CMS/HCC) 7/15/2022    Adm [facility] 7/15-26/22 large sacral wound with necrotic tissue. S/P debridement on 07/15/2022,07/16/2022-> debridement and chronic osteomyelitis  Wound Care VAC to NH.    • Chronic systolic congestive heart failure ,EF 31% 2/10/2022     Chronic systolic CHF.7/20/22 ECHO:Severely decreased LVSF.LVEF; 31 %.Severe P. HTN; RVSP 69 mmHg.Severely increased LA chamber size.Mild-mod MV  regurgitation. A biventricular ICD is planned 08/1/22  because of associated left bundle-branch block. CAD S/P PTCA  stent unknown vessels 11/ 2021 in [place]7/22/22 Nuc Stress test  No ischemia present. Lateral and apical Infarct present. Postexercise aaron   • Congestive cardiac failure (CMS/HCC)    • COPD (chronic obstructive pulmonary disease) (CMS/HCC)    • Coronary artery disease    • Dependent edema 2/10/2022   • Depression     in past - no meds at this time    • Essential (primary) hypertension    • Gastroesophageal reflux disease    • Generalized weakness 7/18/2022   •  GERD (gastroesophageal reflux disease) 2/10/2022   • H/O: depression 2/10/2022   • High cholesterol    • HLD (hyperlipidemia) 2/10/2022   • Hypertensive heart and kidney disease with chronic systolic congestive heart failure and stage 4 chronic kidney disease (CMS/[facility]) 6/21/2022    Chronic systolic CHF.7/20/22 ECHO:Severely decreased LVSF.LVEF; 31 %.Severe P. HTN; RVSP 69 mmHg.Severely increased LA chamber size.Mild-mod MV  regurgitation. A biventricular ICD is planned 08/1/22  because of associated left bundle-branch block. CAD S/P PTCA  stent unknown vessels 11/ 2021 in [place]7/22/22 Nuc Stress test  No ischemia present. Lateral and apical Infarct present. Postexercise glob   • Hypocalcemia 2/10/2022   • Nonrheumatic mild to moderate mitral valve regurgitation 10/1/2022    :7/20/22 ECHO:Severely decreased LVSF.LVEF; 31 %.Severe P. HTN; RVSP 69 mmHg.Severely increased LA chamber size.Mild-mod MV  regurgitation.    • Peripheral vascular disease (CMS/[facility]) 2/10/2022   • Primary hypertension 2/10/2022   • PVD (peripheral vascular disease) (CMS/[facility])    • Severe pulmonary hypertension (CMS/[facility]) 10/1/2022    .7/20/22 ECHO:Severely decreased LVSF.LVEF; 31 %.Severe P. HTN; RVSP 69 mmHg.Severely increased LA chamber size.Mild-mod MV  regurgitation. A biventricular ICD is planned 08/1/22  because of associated left bundle-branch block.    • Spinal stenosis of lumbar region    • Stage 3b chronic kidney disease (CKD) (CMS/[facility])    • Stage 4 chronic kidney disease (CMS/[facility]) 2/10/2022    07/28/2022 creatinine 2.7 GFR 23   • Tobacco use 10/1/2022    1/4 PPD   • Vestibular ataxia (CMS/[facility])    • Vitamin D deficiency      Past Surgical History:   Procedure Laterality Date   • Back surgery  1992    surgery on 4th lumbar disc \"removed it\"    • Colonoscopy      in past has had at least 2-3 - normal findings per patient     • Coronary angioplasty with stent placement  11/05/2021    stenting x1    • Eye surgery Right     cataract extraction  w/ IOL    • Removal gallbladder  1985     Social History     Socioeconomic History   • Marital status: Single     Spouse name: Not on file   • Number of children: Not on file   • Years of education: Not on file   • Highest education level: Not on file   Occupational History   • Occupation: retired   Tobacco Use   • Smoking status: Current Every Day Smoker     Packs/day: 0.25     Years: 40.00     Pack years: 10.00     Types: Cigarettes   • Smokeless tobacco: Never Used   • Tobacco comment: 5 cigarettes a day at most   Vaping Use   • Vaping Use: never used   Substance and Sexual Activity   • Alcohol use: Yes     Comment: occasional    • Drug use: Yes     Types: Marijuana     Comment: 3 x/week    • Sexual activity: Not Currently   Other Topics Concern   • Not on file   Social History Narrative    Social History     Single.  One daughter Lives in [place] with her daughter and granddaughter.  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  Recently discharged from the nursing home on 07/28/2022 to 09/28/2022.    Current Every Day Smoker        Packs/day: 0.25        Years: 40.00        Pack years: 10.00        Types: Cigarettes    · Tobacco comment: 5 cigarettes a day at most    · Vaping Use: never used    · Alcohol use: Yes        Comment: occasional     · Drug use: Yes        Types: Marijuana gummies for his back pain        Comment: 3 x/week , last use 7/2022     JOHNNYAMANDAVICTORINA Patient Representative  [phone]    VERNON TURNER Daughter   [phone]        PMH:    Adm [facility] 7/15-26/22 large sacral wound with necrotic tissue. S/P debridement on 07/15/2022,07/16/2022-> debridement and chronic osteomyelitis  Wound Care VAC to NH.    Chronic systolic CHF.7/20/22 ECHO:Severely decreased LVSF.LVEF; 31 %.Severe P. HTN; RVSP 69 mmHg.Severely increased LA chamber size.Mild-mod MV  regurgitation. A biventricular ICD is planned 08/1/22  because of associated left bundle-branch block, has been postponed until his  pressure sore heals.    CAD S/P PTCA  stent unknown vessels 11/ 2021 in [place]7/22/22 Nuc Stress test  No ischemia present. Lateral and apical Infarct present. Postexercise global LVEF31%.: Abnormal stress test with ef 31% at stress. No ischemia present but large area of Lateral, apical and possibly inferior infarct.    Sev. P. HTN:7/20/22 ECHO:Severely decreased LVSF.LVEF; 31 %.Severe P. HTN; RVSP 69 mmHg.Severely increased LA chamber size.Mild-mod MV  regurgitation.    10/1/22CT Abd/Pelvis:Large left inguinal hernia approximately 6.3 x 9.3 x 10.9 cm left inguinal hernia containing a combination of mesenteric fat and sigmoid colon. The hernia itself appears unchanged from the prior comparison, however currently, there is inflammation noted about a portion of the sigmoid colon within the hernia sac raising the possibility of either diverticulitis or incarceration.Stable appearing left renal lesions. 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 Stable hepatic cyst.                        -------------    What are your living arrangements? family members        What type of residence do you live in?  apartment        Any potential patient safety issues? Stairs up to apartment        Do you drive yourself? drives self and family- usually granddaughter        Any financial problems?  Medical bills -- signing up for different insurance/medicaid -- working with  already     Social Determinants of Health     Financial Resource Strain: Low Risk    • Social Determinants: Financial Resource Strain: None   Food Insecurity: No Food Insecurity   • Social Determinants: Food Insecurity: Never   Transportation Needs: No Transportation Needs   • Lack of Transportation (Medical): No   • Lack of Transportation (Non-Medical): No   Physical Activity: Insufficiently Active   • Days of Exercise per Week: 4 days   • Minutes of  Exercise per Session: 10 min   Stress: Low Risk    • Social Determinants: Stress: A little bit   Social Connections: Socially Integrated   • Social Determinants: Social Connections: 3 to 5 times a week   Intimate Partner Violence: Not At Risk   • Social Determinants: Intimate Partner Violence Past Fear: No   • Social Determinants: Intimate Partner Violence Current Fear: No     Family History   Problem Relation Age of Onset   • Hypertension Mother    • Hypertension Father        Current Facility-Administered Medications   Medication   • HYDROmorphone (DILAUDID) injection 0.2 mg   • hydrALAZINE (APRESOLINE) tablet 100 mg   • aspirin (ECOTRIN) enteric coated tablet 81 mg   • carvedilol (COREG) tablet 6.25 mg   • heparin (porcine) injection 5,000 Units   • hydrALAZINE (APRESOLINE) injection 15 mg   • amLODIPine (NORVASC) tablet 10 mg   • nystatin (MYCOSTATIN) powder   • furosemide (LASIX) tablet 40 mg   • isosorbide mononitrate (IMDUR) ER tablet 30 mg   • meclizine (ANTIVERT) tablet 12.5 mg   • potassium CHLORIDE (KLOR-CON M) ketty ER tablet 20 mEq   • tamsulosin (FLOMAX) capsule 0.4 mg   • [Held by provider] clopidogrel (PLAVIX) tablet 75 mg   • sodium chloride 0.9 % flush bag 25 mL   • sodium chloride (PF) 0.9 % injection 2 mL   • sodium chloride 0.9 % flush bag 25 mL   • sodium chloride (NORMAL SALINE) 0.9 % bolus 500 mL   • Potassium Standard Replacement Protocol (Levels 3.5 and lower)   • Magnesium Standard Replacement Protocol   • ondansetron (ZOFRAN) injection 4 mg   • acetaminophen (TYLENOL) tablet 650 mg   • polyethylene glycol (MIRALAX) packet 17 g   • HYDROcodone-acetaminophen (NORCO) 5-325 MG per tablet 1 tablet   • docusate sodium-sennosides (SENOKOT S) 50-8.6 MG 2 tablet   • bisacodyl (DULCOLAX) suppository 10 mg        ALLERGIES:  Patient has no known allergies.    OBJECTIVE:  Vital Signs:    Visit Vitals  BP (!) 160/79 (BP Location: LUE - Left upper extremity, Patient Position: Semi-Daly's)   Pulse 75    Temp 98 °F (36.7 °C) (Oral)   Resp 16   Ht 6' (1.829 m)   Wt 81.6 kg (179 lb 14.4 oz)   SpO2 99%   BMI 24.40 kg/m²         Physical Exam:  General appearance: Appears stated age, Alert, in no distress and cooperative  Head:   normocephalic without obvious abnormality  Ears:   gross hearing intact  Mouth:   mucous membranes moist  Pulmonary: normal respiratory effort     Left buttock with full thickness wound. Wound base is mostly granular tissue with a small amount of slough.  No palpable bone. No significant tunneling or undermining.  Moderate serous drainage. No purulence or malodor. Haydee-wound is intact without erythema, warmth or induration.    10/10              10/3/22          7/25/22          7/18/22          Wound Bed Quality:  Granulation tissue      Haydee-wound Quality:    None    Additional Descriptors:  none    Wound Measurements Per Flowsheet:       Wound Coccyx Left Pressure Injury (Active)   Wound Length (cm) 2.1 cm 10/09/22 0835   Wound Width (cm) 1.8 cm 10/09/22 0835   Wound Depth (cm) 0.2 cm 10/09/22 0835   Wound Surface Area (cm^2) 3.78 cm^2 10/09/22 0835   Wound Volume (cm^3) 0.756 cm^3 10/09/22 0835   Wound Volume Change (Initial) -39.22 cm3 10/09/22 0835   Wound Volume % Change (Initial) -98.11 % 10/09/22 0835   Number of days: 87       Wound Groin Right Moisture Associated Skin Damage (MASD) (Active)   Number of days: 9       Wound Abdomen  Incision (Active)   Number of days: 1         PROCEDURE:  None performed    Procedure was Performed by:  Not applicable    Laboratory assessments reviewed:  No results found for: PAB   Albumin (g/dL)   Date Value   10/02/2022 2.7 (L)   10/01/2022 3.2 (L)   07/22/2022 2.5 (L)      No results available in last 24 hours    Lab Results   Component Value Date    WBC 8.7 10/10/2022    GLUCOSE 117 (H) 10/10/2022    HGBA1C 6.0 (H) 07/17/2022    CRP 10.4 (H) 07/15/2022    RESR 29 (H) 07/15/2022    CREATININE 2.69 (H) 10/10/2022        Culture results:  No results  found for: SDES No results found for: CULT     Diagnostic Assessments Reviewed:  CT Scan     7/15/22 CT Pelvis  IMPRESSION:      1. Inflammatory changes in the simultaneous tissues of the left buttock  adjacent to the gluteal crease and extending inferiorly to the ischial  rectal sling and laura-Rectal region. No discrete abscess is seen. Gas is  identified in the subcutaneous tissues with a question of fistulous  connection through the skin at the level of the superior aspect of the  gluteal crease..  2. No definitive findings of bone erosion or destruction associated with  the inflammatory changes involving the distal sacrum and coccyx.  3. No abnormal fluid collections identified above the ischial rectal sling  in the deep pelvis. No free fluid in the pelvis.  4. Moderate to marked prostate gland enlargement. Some bladder wall  thickening of bladder diverticula are probably related to bladder outlet  obstructive changes.  5. Very large left inguinal hernia containing loops of sigmoid colon, no  obstructive changes. Moderate-sized right inguinal hernia containing fat.  6. Large lipoma identified deep to the obturator musculature and hamstring  musculature in the upper portion of the right thigh. This lipoma extends  below the inferior margin of the scan. It measures at least 10 x 5.3 cm  transversely..  7. Degenerative changes of spine. No degenerative changes of the hips.  8. 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 The aorta measures 3.9 cm in diameter at the  superior aspect of the CT exam.       Nutritional Assessment:  Prealbumin and/or Albumin reviewed    Wound treatment goals are palliative:  No    DIAGNOSES:  Non-healing surgical wound L buttock  Pressure ulcer, other site, stage Unstageable buttocks  Obesity  Smoker   Pressure ulcer, left buttock, initially unstageable, stage 4 post debridement  NICMP  S/P Hernia repain    Medical Decision Makin  year old non diabetic seen regarding L buttock wound.   S/P bedside debridement performed by surgery at [facility] in July, 2022.    Wound bed mostly granular with moderate serous drainage. Will start aquacel Ag to be changed every other day.     Local Wound Care:   Wash with soap and water, pat dry  Aquacel Ag to wound bed, cover with dry cover dressing  Change every other day and PRN  (can change 3 times per week upon discharge)      Infectious Disease:  Wound does not appear infected    Nutrition:  Encouraged increased oral protein intake for wound healing    Endocrine:  Recommend strict glycemic control for wound healing. Last glycohemoglobin was   Hemoglobin A1C (%)   Date Value   07/17/2022 6.0 (H)       Offloading:  Avoid pressure to area  Reposition frequently, at least every 2 hours  Waffle cushion while up to chair  Should be on low air loss mattress     Tobacco:  Recommend cessation    Surgery:  S/P bedside debridement of wound in July 2022    Hyperbarics:  No indication currently    Will place orders for home nursing to resume on discharge.  He has clinic follow up scheduled for 10/24/22      Plan of Care:  Advanced Wound Care Recommendations:  As above  Percent Wound Closure from consult:  n/a  Care plan to augment wound closure:  Not applicable.  consult 7/18/22    Patient stable.    Significant note data copied forward from my prior note and reviewed by me as needed in the formulation of this note and plan.    Ning Tinajero NP

## 2024-04-09 NOTE — ASSESSMENT & PLAN NOTE
Lab Results   Component Value Date    TSH 3.29 03/30/2023   Stable  Continue Synthroid 50 mcg daily.  Repeat TSH ordered.

## 2024-04-10 LAB
25(OH)D3 SERPL-MCNC: 37 NG/ML (ref 30–100)
ALBUMIN SERPL BCP-MCNC: 4.3 G/DL (ref 3.4–5)
ALP SERPL-CCNC: 118 U/L (ref 33–136)
ALT SERPL W P-5'-P-CCNC: 12 U/L (ref 7–45)
ANION GAP SERPL CALC-SCNC: 13 MMOL/L (ref 10–20)
AST SERPL W P-5'-P-CCNC: 14 U/L (ref 9–39)
BASOPHILS # BLD AUTO: 0.09 X10*3/UL (ref 0–0.1)
BASOPHILS NFR BLD AUTO: 1.4 %
BILIRUB SERPL-MCNC: 0.6 MG/DL (ref 0–1.2)
BUN SERPL-MCNC: 24 MG/DL (ref 6–23)
CALCIUM SERPL-MCNC: 9.9 MG/DL (ref 8.6–10.6)
CHLORIDE SERPL-SCNC: 104 MMOL/L (ref 98–107)
CHOLEST SERPL-MCNC: 185 MG/DL (ref 0–199)
CHOLESTEROL/HDL RATIO: 2.8
CO2 SERPL-SCNC: 30 MMOL/L (ref 21–32)
CREAT SERPL-MCNC: 0.93 MG/DL (ref 0.5–1.05)
EGFRCR SERPLBLD CKD-EPI 2021: 61 ML/MIN/1.73M*2
EOSINOPHIL # BLD AUTO: 0.25 X10*3/UL (ref 0–0.4)
EOSINOPHIL NFR BLD AUTO: 3.9 %
ERYTHROCYTE [DISTWIDTH] IN BLOOD BY AUTOMATED COUNT: 12.7 % (ref 11.5–14.5)
GLUCOSE SERPL-MCNC: 88 MG/DL (ref 74–99)
HCT VFR BLD AUTO: 43.9 % (ref 36–46)
HDLC SERPL-MCNC: 65.3 MG/DL
HGB BLD-MCNC: 14.8 G/DL (ref 12–16)
IMM GRANULOCYTES # BLD AUTO: 0.02 X10*3/UL (ref 0–0.5)
IMM GRANULOCYTES NFR BLD AUTO: 0.3 % (ref 0–0.9)
LDLC SERPL CALC-MCNC: 100 MG/DL
LYMPHOCYTES # BLD AUTO: 1.12 X10*3/UL (ref 0.8–3)
LYMPHOCYTES NFR BLD AUTO: 17.6 %
MCH RBC QN AUTO: 31 PG (ref 26–34)
MCHC RBC AUTO-ENTMCNC: 33.7 G/DL (ref 32–36)
MCV RBC AUTO: 92 FL (ref 80–100)
MONOCYTES # BLD AUTO: 0.49 X10*3/UL (ref 0.05–0.8)
MONOCYTES NFR BLD AUTO: 7.7 %
NEUTROPHILS # BLD AUTO: 4.38 X10*3/UL (ref 1.6–5.5)
NEUTROPHILS NFR BLD AUTO: 69.1 %
NON HDL CHOLESTEROL: 120 MG/DL (ref 0–149)
NRBC BLD-RTO: 0 /100 WBCS (ref 0–0)
PLATELET # BLD AUTO: 195 X10*3/UL (ref 150–450)
POTASSIUM SERPL-SCNC: 4.5 MMOL/L (ref 3.5–5.3)
PROT SERPL-MCNC: 6.5 G/DL (ref 6.4–8.2)
RBC # BLD AUTO: 4.77 X10*6/UL (ref 4–5.2)
SODIUM SERPL-SCNC: 142 MMOL/L (ref 136–145)
TRIGL SERPL-MCNC: 99 MG/DL (ref 0–149)
TSH SERPL-ACNC: 2.68 MIU/L (ref 0.44–3.98)
VLDL: 20 MG/DL (ref 0–40)
WBC # BLD AUTO: 6.4 X10*3/UL (ref 4.4–11.3)

## 2024-04-12 ENCOUNTER — CLINICAL SUPPORT (OUTPATIENT)
Dept: AUDIOLOGY | Facility: CLINIC | Age: 84
End: 2024-04-12

## 2024-04-12 DIAGNOSIS — H90.3 SENSORINEURAL HEARING LOSS (SNHL) OF BOTH EARS: Primary | ICD-10-CM

## 2024-04-12 DIAGNOSIS — N30.00 ACUTE CYSTITIS WITHOUT HEMATURIA: ICD-10-CM

## 2024-04-12 RX ORDER — CIPROFLOXACIN 250 MG/1
250 TABLET, FILM COATED ORAL 2 TIMES DAILY
Qty: 10 TABLET | Refills: 1 | Status: SHIPPED | OUTPATIENT
Start: 2024-04-12 | End: 2024-04-17

## 2024-04-12 NOTE — PROGRESS NOTES
[facility], [facility]UDI[facility]  HEARING AID CHECK      Name:  Deepa Harrell  YOB: 1940  Today's date:  04/12/24      PATIENT ISSUES/CONCERNS AND OTOSCOPIC RESULTS  Otoscopic was clear for the left ear with excessive but non occluding cerumen present for the right ear.      EARMOLD FITTING  Fit the the patient with her new earmolds.  Good fit noted in the office.      Good subjective audibility.  Gave the patient her old earmolds to hang onto if needed.  Explained fit/remake warranty.    FOLLOW UP   The patient was asked to contact the office if they notice any significant change in hearing level or hearing aid function.    Otherwise, will follow up again in 6 months with a hearing test.    APPOINTMENT TIME  1:30-2:00pm    Melanie Simon  Doctor of Audiology  Senior Audiologist

## 2024-04-18 DIAGNOSIS — E03.9 HYPOTHYROIDISM (ACQUIRED): ICD-10-CM

## 2024-04-18 RX ORDER — LEVOTHYROXINE SODIUM 50 UG/1
50 TABLET ORAL DAILY
Qty: 90 TABLET | Refills: 3 | Status: SHIPPED | OUTPATIENT
Start: 2024-04-18

## 2024-05-19 DIAGNOSIS — I10 PRIMARY HYPERTENSION: ICD-10-CM

## 2024-05-20 RX ORDER — LISINOPRIL 10 MG/1
10 TABLET ORAL DAILY
Qty: 90 TABLET | Refills: 3 | Status: SHIPPED | OUTPATIENT
Start: 2024-05-20

## 2024-07-08 DIAGNOSIS — J30.9 ALLERGIC RHINITIS, UNSPECIFIED: ICD-10-CM

## 2024-07-08 RX ORDER — CETIRIZINE HYDROCHLORIDE 10 MG/1
TABLET ORAL
Qty: 90 TABLET | Refills: 3 | Status: SHIPPED | OUTPATIENT
Start: 2024-07-08

## 2024-07-26 ENCOUNTER — HOSPITAL ENCOUNTER (OUTPATIENT)
Dept: HOSPITAL 100 - ED | Age: 84
Setting detail: OBSERVATION
LOS: 2 days | Discharge: HOME | End: 2024-07-28
Payer: MEDICARE

## 2024-07-26 VITALS
OXYGEN SATURATION: 94 % | SYSTOLIC BLOOD PRESSURE: 112 MMHG | DIASTOLIC BLOOD PRESSURE: 73 MMHG | HEART RATE: 101 BPM | RESPIRATION RATE: 20 BRPM

## 2024-07-26 VITALS
RESPIRATION RATE: 16 BRPM | OXYGEN SATURATION: 95 % | SYSTOLIC BLOOD PRESSURE: 130 MMHG | HEART RATE: 90 BPM | DIASTOLIC BLOOD PRESSURE: 61 MMHG

## 2024-07-26 VITALS
OXYGEN SATURATION: 93 % | RESPIRATION RATE: 14 BRPM | SYSTOLIC BLOOD PRESSURE: 133 MMHG | HEART RATE: 92 BPM | DIASTOLIC BLOOD PRESSURE: 67 MMHG

## 2024-07-26 VITALS
DIASTOLIC BLOOD PRESSURE: 61 MMHG | HEART RATE: 87 BPM | OXYGEN SATURATION: 998 % | RESPIRATION RATE: 17 BRPM | TEMPERATURE: 97.3 F | SYSTOLIC BLOOD PRESSURE: 160 MMHG

## 2024-07-26 VITALS
HEART RATE: 75 BPM | BODY MASS INDEX: 30.2 KG/M2 | RESPIRATION RATE: 16 BRPM | DIASTOLIC BLOOD PRESSURE: 60 MMHG | BODY MASS INDEX: 32.2 KG/M2 | OXYGEN SATURATION: 90 % | TEMPERATURE: 97.8 F | SYSTOLIC BLOOD PRESSURE: 117 MMHG

## 2024-07-26 VITALS
HEART RATE: 86 BPM | RESPIRATION RATE: 18 BRPM | OXYGEN SATURATION: 93 % | DIASTOLIC BLOOD PRESSURE: 79 MMHG | SYSTOLIC BLOOD PRESSURE: 113 MMHG | TEMPERATURE: 98.1 F

## 2024-07-26 DIAGNOSIS — I10: ICD-10-CM

## 2024-07-26 DIAGNOSIS — H35.3210: ICD-10-CM

## 2024-07-26 DIAGNOSIS — E03.9: ICD-10-CM

## 2024-07-26 DIAGNOSIS — Z79.899: ICD-10-CM

## 2024-07-26 DIAGNOSIS — Z79.890: ICD-10-CM

## 2024-07-26 DIAGNOSIS — E66.9: ICD-10-CM

## 2024-07-26 DIAGNOSIS — S06.0X0A: ICD-10-CM

## 2024-07-26 DIAGNOSIS — R79.89: ICD-10-CM

## 2024-07-26 DIAGNOSIS — S39.012A: ICD-10-CM

## 2024-07-26 DIAGNOSIS — R53.1: ICD-10-CM

## 2024-07-26 DIAGNOSIS — H91.90: ICD-10-CM
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
## 2024-07-26 DIAGNOSIS — S02.2XXA: Primary | ICD-10-CM

## 2024-07-26 DIAGNOSIS — Z23: ICD-10-CM

## 2024-07-26 DIAGNOSIS — R53.81: ICD-10-CM
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
## 2024-07-26 DIAGNOSIS — S81.812A: ICD-10-CM

## 2024-07-26 DIAGNOSIS — S41.111A: ICD-10-CM

## 2024-07-26 DIAGNOSIS — E78.5: ICD-10-CM

## 2024-07-26 LAB
ALANINE AMINOTRANSFER ALT/SGPT: 83 U/L (ref 13–56)
ALBUMIN SERPL-MCNC: 3.4 G/DL (ref 3.2–5)
ALKALINE PHOSPHATASE: 134 U/L (ref 45–117)
ANION GAP: 9 (ref 5–15)
AST(SGOT): 109 U/L (ref 15–37)
BILIRUB DIRECT SERPL-MCNC: 0.22 MG/DL (ref 0–0.3)
BUN SERPL-MCNC: 20 MG/DL (ref 7–18)
BUN/CREAT RATIO: 15.6 RATIO (ref 10–20)
CALCIUM SERPL-MCNC: 9.1 MG/DL (ref 8.5–10.1)
CARBON DIOXIDE: 25 MMOL/L (ref 21–32)
CHLORIDE: 108 MMOL/L (ref 98–107)
DEPRECATED RDW RBC: 43.8 FL (ref 35.1–43.9)
ERYTHROCYTE [DISTWIDTH] IN BLOOD: 12.9 % (ref 11.6–14.6)
EST GLOM FILT RATE - AFR AMER: 51 ML/MIN (ref 60–?)
ESTIMATED CREATININE CLEARANCE: 35.82 ML/MIN
GLOBULIN: 3.3 G/DL (ref 2.2–4.2)
GLUCOSE: 128 MG/DL (ref 74–106)
HCT VFR BLD AUTO: 42.2 % (ref 37–47)
HEMOGLOBIN: 14 G/DL (ref 12–15)
HGB BLD-MCNC: 14 G/DL (ref 12–15)
IMMATURE GRANULOCYTES COUNT: 0.14 X10^3/UL (ref 0–0)
LIPASE: 90 U/L (ref 13–75)
MCV RBC: 93 FL (ref 81–99)
MEAN CORP HGB CONC: 33.2 G/DL (ref 32–36)
MEAN PLATELET VOL.: 10.8 FL (ref 6.2–12)
MUCOUS THREADS URNS QL MICRO: (no result) /HPF
NRBC FLAGGED BY ANALYZER: 0 % (ref 0–5)
PLATELET # BLD: 209 K/MM3 (ref 150–450)
PLATELET COUNT: 209 K/MM3 (ref 150–450)
POTASSIUM: 3.6 MMOL/L (ref 3.5–5.1)
PROT UR QL STRIP.AUTO: 15 MG/DL
RBC # BLD AUTO: 4.54 M/MM3 (ref 4.2–5.4)
RBC DISTRIBUTION WIDTH CV: 12.9 % (ref 11.6–14.6)
RBC DISTRIBUTION WIDTH SD: 43.8 FL (ref 35.1–43.9)
RBC UR QL: (no result) /HPF (ref 0–5)
RBC UR QL: 50 /UL
SP GR UR: 1.01 (ref 1–1.03)
SQUAMOUS URNS QL MICRO: (no result) /HPF (ref 5–10)
TROPONIN-I HS: < 3 PG/ML (ref 3–54)
URINE PRESERVATIVE: (no result)
WBC # BLD AUTO: 11.6 K/MM3 (ref 4.4–11)
WHITE BLOOD COUNT: 11.6 K/MM3 (ref 4.4–11)

## 2024-07-26 PROCEDURE — 80053 COMPREHEN METABOLIC PANEL: CPT

## 2024-07-26 PROCEDURE — 71260 CT THORAX DX C+: CPT

## 2024-07-26 PROCEDURE — 85025 COMPLETE CBC W/AUTO DIFF WBC: CPT

## 2024-07-26 PROCEDURE — 97535 SELF CARE MNGMENT TRAINING: CPT

## 2024-07-26 PROCEDURE — 80076 HEPATIC FUNCTION PANEL: CPT

## 2024-07-26 PROCEDURE — A4216 STERILE WATER/SALINE, 10 ML: HCPCS

## 2024-07-26 PROCEDURE — 84484 ASSAY OF TROPONIN QUANT: CPT

## 2024-07-26 PROCEDURE — 94668 MNPJ CHEST WALL SBSQ: CPT

## 2024-07-26 PROCEDURE — 80048 BASIC METABOLIC PNL TOTAL CA: CPT

## 2024-07-26 PROCEDURE — 90715 TDAP VACCINE 7 YRS/> IM: CPT

## 2024-07-26 PROCEDURE — 96375 TX/PRO/DX INJ NEW DRUG ADDON: CPT

## 2024-07-26 PROCEDURE — 70486 CT MAXILLOFACIAL W/O DYE: CPT

## 2024-07-26 PROCEDURE — 96361 HYDRATE IV INFUSION ADD-ON: CPT

## 2024-07-26 PROCEDURE — 97162 PT EVAL MOD COMPLEX 30 MIN: CPT

## 2024-07-26 PROCEDURE — 73590 X-RAY EXAM OF LOWER LEG: CPT

## 2024-07-26 PROCEDURE — 96372 THER/PROPH/DIAG INJ SC/IM: CPT

## 2024-07-26 PROCEDURE — 97166 OT EVAL MOD COMPLEX 45 MIN: CPT

## 2024-07-26 PROCEDURE — 83690 ASSAY OF LIPASE: CPT

## 2024-07-26 PROCEDURE — 99221 1ST HOSP IP/OBS SF/LOW 40: CPT

## 2024-07-26 PROCEDURE — 72125 CT NECK SPINE W/O DYE: CPT

## 2024-07-26 PROCEDURE — 74177 CT ABD & PELVIS W/CONTRAST: CPT

## 2024-07-26 PROCEDURE — 81001 URINALYSIS AUTO W/SCOPE: CPT

## 2024-07-26 PROCEDURE — 99285 EMERGENCY DEPT VISIT HI MDM: CPT

## 2024-07-26 PROCEDURE — 73080 X-RAY EXAM OF ELBOW: CPT

## 2024-07-26 PROCEDURE — G0378 HOSPITAL OBSERVATION PER HR: HCPCS

## 2024-07-26 PROCEDURE — 85027 COMPLETE CBC AUTOMATED: CPT

## 2024-07-26 PROCEDURE — 96374 THER/PROPH/DIAG INJ IV PUSH: CPT

## 2024-07-26 PROCEDURE — 70450 CT HEAD/BRAIN W/O DYE: CPT

## 2024-07-26 RX ADMIN — Medication 3 MG: at 21:42

## 2024-07-26 RX ADMIN — SODIUM CHLORIDE 1000 ML: 9 INJECTION, SOLUTION INTRAVENOUS at 16:45

## 2024-07-26 RX ADMIN — SODIUM CHLORIDE, PRESERVATIVE FREE 0 ML: 5 INJECTION INTRAVENOUS at 21:42

## 2024-07-27 VITALS
TEMPERATURE: 97.3 F | HEART RATE: 70 BPM | OXYGEN SATURATION: 98 % | RESPIRATION RATE: 18 BRPM | SYSTOLIC BLOOD PRESSURE: 114 MMHG | DIASTOLIC BLOOD PRESSURE: 65 MMHG

## 2024-07-27 VITALS
TEMPERATURE: 98.24 F | SYSTOLIC BLOOD PRESSURE: 125 MMHG | HEART RATE: 74 BPM | RESPIRATION RATE: 16 BRPM | OXYGEN SATURATION: 95 % | DIASTOLIC BLOOD PRESSURE: 81 MMHG

## 2024-07-27 VITALS
SYSTOLIC BLOOD PRESSURE: 96 MMHG | DIASTOLIC BLOOD PRESSURE: 54 MMHG | OXYGEN SATURATION: 96 % | HEART RATE: 78 BPM | TEMPERATURE: 98.6 F | RESPIRATION RATE: 18 BRPM

## 2024-07-27 VITALS — OXYGEN SATURATION: 97 %

## 2024-07-27 VITALS
OXYGEN SATURATION: 98 % | HEART RATE: 70 BPM | RESPIRATION RATE: 18 BRPM | DIASTOLIC BLOOD PRESSURE: 75 MMHG | SYSTOLIC BLOOD PRESSURE: 112 MMHG | TEMPERATURE: 98.6 F

## 2024-07-27 LAB
ALANINE AMINOTRANSFER ALT/SGPT: 64 U/L (ref 13–56)
ALBUMIN SERPL-MCNC: 3.2 G/DL (ref 3.2–5)
ALKALINE PHOSPHATASE: 112 U/L (ref 45–117)
ANION GAP: 6 (ref 5–15)
AST(SGOT): 53 U/L (ref 15–37)
BUN SERPL-MCNC: 26 MG/DL (ref 7–18)
BUN/CREAT RATIO: 27.7 RATIO (ref 10–20)
CALCIUM SERPL-MCNC: 8.5 MG/DL (ref 8.5–10.1)
CARBON DIOXIDE: 23 MMOL/L (ref 21–32)
CHLORIDE: 111 MMOL/L (ref 98–107)
DEPRECATED RDW RBC: 44.1 FL (ref 35.1–43.9)
ERYTHROCYTE [DISTWIDTH] IN BLOOD: 12.8 % (ref 11.6–14.6)
EST GLOM FILT RATE - AFR AMER: 73 ML/MIN (ref 60–?)
ESTIMATED CREATININE CLEARANCE: 38.92 ML/MIN
GLOBULIN: 2.8 G/DL (ref 2.2–4.2)
GLUCOSE: 117 MG/DL (ref 74–106)
HCT VFR BLD AUTO: 37.3 % (ref 37–47)
HEMOGLOBIN: 12.2 G/DL (ref 12–15)
HGB BLD-MCNC: 12.2 G/DL (ref 12–15)
MCV RBC: 93.5 FL (ref 81–99)
MEAN CORP HGB CONC: 32.7 G/DL (ref 32–36)
MEAN PLATELET VOL.: 11.1 FL (ref 6.2–12)
PLATELET # BLD: 162 K/MM3 (ref 150–450)
PLATELET COUNT: 162 K/MM3 (ref 150–450)
POTASSIUM: 4 MMOL/L (ref 3.5–5.1)
RBC # BLD AUTO: 3.99 M/MM3 (ref 4.2–5.4)
RBC DISTRIBUTION WIDTH CV: 12.8 % (ref 11.6–14.6)
RBC DISTRIBUTION WIDTH SD: 44.1 FL (ref 35.1–43.9)
WBC # BLD AUTO: 9.4 K/MM3 (ref 4.4–11)
WHITE BLOOD COUNT: 9.4 K/MM3 (ref 4.4–11)

## 2024-07-27 RX ADMIN — Medication 3 MG: at 22:40

## 2024-07-27 RX ADMIN — CIPROFLOXACIN 2 DRP: 3 SOLUTION OPHTHALMIC at 22:35

## 2024-07-27 RX ADMIN — CIPROFLOXACIN 2 DRP: 3 SOLUTION OPHTHALMIC at 18:24

## 2024-07-27 RX ADMIN — CIPROFLOXACIN 2 DRP: 3 SOLUTION OPHTHALMIC at 13:57

## 2024-07-28 ENCOUNTER — TELEPHONE (OUTPATIENT)
Dept: PRIMARY CARE | Facility: CLINIC | Age: 84
End: 2024-07-28
Payer: MEDICARE

## 2024-07-28 VITALS
HEART RATE: 74 BPM | OXYGEN SATURATION: 95 % | DIASTOLIC BLOOD PRESSURE: 83 MMHG | SYSTOLIC BLOOD PRESSURE: 134 MMHG | RESPIRATION RATE: 18 BRPM | TEMPERATURE: 98.42 F

## 2024-07-28 VITALS
HEART RATE: 74 BPM | DIASTOLIC BLOOD PRESSURE: 86 MMHG | RESPIRATION RATE: 18 BRPM | SYSTOLIC BLOOD PRESSURE: 136 MMHG | TEMPERATURE: 98.7 F | OXYGEN SATURATION: 96 %

## 2024-07-28 VITALS — OXYGEN SATURATION: 93 %

## 2024-07-28 VITALS
OXYGEN SATURATION: 96 % | RESPIRATION RATE: 20 BRPM | HEART RATE: 80 BPM | DIASTOLIC BLOOD PRESSURE: 79 MMHG | SYSTOLIC BLOOD PRESSURE: 137 MMHG | TEMPERATURE: 98 F

## 2024-07-28 RX ADMIN — CIPROFLOXACIN 2 DRP: 3 SOLUTION OPHTHALMIC at 02:47

## 2024-07-28 RX ADMIN — CIPROFLOXACIN 2 DRP: 3 SOLUTION OPHTHALMIC at 10:46

## 2024-07-28 RX ADMIN — CIPROFLOXACIN 2 DRP: 3 SOLUTION OPHTHALMIC at 06:08
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
## 2024-07-29 NOTE — TELEPHONE ENCOUNTER
Pt scheduled at 10:00 am on Wednesday. Pt daughter, Carolina, was informed and verbalized understanding.

## 2024-07-31 ENCOUNTER — OFFICE VISIT (OUTPATIENT)
Dept: PRIMARY CARE | Facility: CLINIC | Age: 84
End: 2024-07-31
Payer: MEDICARE

## 2024-07-31 VITALS
OXYGEN SATURATION: 95 % | SYSTOLIC BLOOD PRESSURE: 109 MMHG | DIASTOLIC BLOOD PRESSURE: 75 MMHG | TEMPERATURE: 97.6 F | HEART RATE: 84 BPM | BODY MASS INDEX: 28.87 KG/M2 | WEIGHT: 150.3 LBS

## 2024-07-31 DIAGNOSIS — R74.8 ELEVATED LIVER ENZYMES: ICD-10-CM

## 2024-07-31 DIAGNOSIS — R79.89 ELEVATED SERUM CREATININE: ICD-10-CM

## 2024-07-31 DIAGNOSIS — R74.8 ELEVATED LIPASE: ICD-10-CM

## 2024-07-31 DIAGNOSIS — S06.0X1A CONCUSSION WITH LOSS OF CONSCIOUSNESS OF 30 MINUTES OR LESS, INITIAL ENCOUNTER: ICD-10-CM
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
## 2024-07-31 DIAGNOSIS — F43.9 SITUATIONAL STRESS: ICD-10-CM

## 2024-07-31 DIAGNOSIS — R42 VERTIGO: ICD-10-CM

## 2024-07-31 DIAGNOSIS — M89.9 LESION OF PELVIC BONE: ICD-10-CM

## 2024-07-31 PROCEDURE — 3074F SYST BP LT 130 MM HG: CPT | Performed by: FAMILY MEDICINE

## 2024-07-31 PROCEDURE — 1160F RVW MEDS BY RX/DR IN RCRD: CPT | Performed by: FAMILY MEDICINE

## 2024-07-31 PROCEDURE — 1123F ACP DISCUSS/DSCN MKR DOCD: CPT | Performed by: FAMILY MEDICINE

## 2024-07-31 PROCEDURE — 1159F MED LIST DOCD IN RCRD: CPT | Performed by: FAMILY MEDICINE

## 2024-07-31 PROCEDURE — 99214 OFFICE O/P EST MOD 30 MIN: CPT | Performed by: FAMILY MEDICINE

## 2024-07-31 PROCEDURE — 3078F DIAST BP <80 MM HG: CPT | Performed by: FAMILY MEDICINE

## 2024-07-31 RX ORDER — SERTRALINE HYDROCHLORIDE 25 MG/1
25 TABLET, FILM COATED ORAL DAILY
Qty: 30 TABLET | Refills: 1 | Status: SHIPPED | OUTPATIENT
Start: 2024-07-31 | End: 2024-09-29

## 2024-07-31 ASSESSMENT — ENCOUNTER SYMPTOMS
BACK PAIN: 1
DIZZINESS: 1
WOUND: 1
DYSPHORIC MOOD: 1
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
## 2024-08-05 PROBLEM — F43.9 SITUATIONAL STRESS: Status: ACTIVE | Noted: 2024-08-05

## 2024-08-05 PROBLEM — S06.0X1A CONCUSSION WTH LOSS OF CONSCIOUSNESS OF 30 MINUTES OR LESS: Status: ACTIVE | Noted: 2024-08-05

## 2024-08-05 PROBLEM — R42 VERTIGO: Status: ACTIVE | Noted: 2024-08-05
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
## 2024-08-05 NOTE — ASSESSMENT & PLAN NOTE
Reffrain from exertional activities  Refer home PT for vestibular PT  Contact if worsened symptoms - headache, vomiting, lethargy

## 2024-08-06 ENCOUNTER — LAB (OUTPATIENT)
Dept: LAB | Facility: LAB | Age: 84
End: 2024-08-06
Payer: MEDICARE

## 2024-08-06 DIAGNOSIS — R74.8 ELEVATED LIVER ENZYMES: ICD-10-CM

## 2024-08-06 DIAGNOSIS — R74.8 ELEVATED LIPASE: ICD-10-CM

## 2024-08-06 DIAGNOSIS — R79.89 ELEVATED SERUM CREATININE: ICD-10-CM

## 2024-08-06 PROCEDURE — 83690 ASSAY OF LIPASE: CPT

## 2024-08-06 PROCEDURE — 80053 COMPREHEN METABOLIC PANEL: CPT

## 2024-08-06 PROCEDURE — 36415 COLL VENOUS BLD VENIPUNCTURE: CPT

## 2024-08-07 LAB
ALBUMIN SERPL BCP-MCNC: 4.1 G/DL (ref 3.4–5)
ALP SERPL-CCNC: 140 U/L (ref 33–136)
ALT SERPL W P-5'-P-CCNC: 15 U/L (ref 7–45)
ANION GAP SERPL CALC-SCNC: 16 MMOL/L (ref 10–20)
AST SERPL W P-5'-P-CCNC: 14 U/L (ref 9–39)
BILIRUB SERPL-MCNC: 0.6 MG/DL (ref 0–1.2)
BUN SERPL-MCNC: 18 MG/DL (ref 6–23)
CALCIUM SERPL-MCNC: 9.4 MG/DL (ref 8.6–10.6)
CHLORIDE SERPL-SCNC: 106 MMOL/L (ref 98–107)
CO2 SERPL-SCNC: 25 MMOL/L (ref 21–32)
CREAT SERPL-MCNC: 0.87 MG/DL (ref 0.5–1.05)
EGFRCR SERPLBLD CKD-EPI 2021: 66 ML/MIN/1.73M*2
GLUCOSE SERPL-MCNC: 82 MG/DL (ref 74–99)
LIPASE SERPL-CCNC: 27 U/L (ref 9–82)
POTASSIUM SERPL-SCNC: 4.2 MMOL/L (ref 3.5–5.3)
PROT SERPL-MCNC: 6.5 G/DL (ref 6.4–8.2)
SODIUM SERPL-SCNC: 143 MMOL/L (ref 136–145)

## 2024-08-14 ENCOUNTER — APPOINTMENT (OUTPATIENT)
Dept: RADIOLOGY | Facility: CLINIC | Age: 84
End: 2024-08-14
Payer: MEDICARE

## 2024-08-23 ENCOUNTER — HOSPITAL ENCOUNTER (OUTPATIENT)
Dept: RADIOLOGY | Facility: CLINIC | Age: 84
End: 2024-08-23
Payer: MEDICARE

## 2024-08-24 DIAGNOSIS — I10 PRIMARY HYPERTENSION: ICD-10-CM

## 2024-08-24 RX ORDER — LISINOPRIL 5 MG/1
5 TABLET ORAL DAILY
Qty: 90 TABLET | Refills: 3 | Status: SHIPPED | OUTPATIENT
Start: 2024-08-24 | End: 2025-08-24

## 2024-08-24 NOTE — PROGRESS NOTES
The patient had 2 episodes of lightheadedness and dizzy spells. One episode occurred in the middle of the night she was getting up to urinate and she fell/passed out. Woke up in her own urine.  The Home Physical Therapist came to the house on Friday October 23.  No Vertigo however, her blood pressure was dropping when she was standing.  Positive Orthostatics.  Repeat Blood pressures continue to show  orthostatic blood pressure,   laying down 127/73 pulse 62  Sitting 124/77  pulse 74  Standing 112/77 Pulse 74  Mild lightheaded  I am recommending she reduce her lisinopril to 5 mg once a day down from 10 mg a day and continue to check blood pressure daily and report back if symptoms persist or if blood pressure remains low

## 2024-09-03 ENCOUNTER — APPOINTMENT (OUTPATIENT)
Dept: PRIMARY CARE | Facility: CLINIC | Age: 84
End: 2024-09-03
Payer: MEDICARE

## 2024-09-11 DIAGNOSIS — J30.1 SEASONAL ALLERGIC RHINITIS DUE TO POLLEN: ICD-10-CM

## 2024-09-11 RX ORDER — EPINEPHRINE 0.3 MG/.3ML
0.3 INJECTION SUBCUTANEOUS AS NEEDED
Qty: 2 EACH | Refills: 3 | Status: SHIPPED | OUTPATIENT
Start: 2024-09-11

## 2024-09-12 ENCOUNTER — HOSPITAL ENCOUNTER (OUTPATIENT)
Dept: RADIOLOGY | Facility: CLINIC | Age: 84
End: 2024-09-12
Payer: MEDICARE

## 2024-09-17 ENCOUNTER — HOSPITAL ENCOUNTER (OUTPATIENT)
Dept: RADIOLOGY | Facility: CLINIC | Age: 84
End: 2024-09-17
Payer: MEDICARE

## 2024-09-18 ENCOUNTER — APPOINTMENT (OUTPATIENT)
Dept: PRIMARY CARE | Facility: CLINIC | Age: 84
End: 2024-09-18
Payer: MEDICARE

## 2024-09-18 VITALS
SYSTOLIC BLOOD PRESSURE: 141 MMHG | RESPIRATION RATE: 14 BRPM | TEMPERATURE: 97.6 F | BODY MASS INDEX: 27.78 KG/M2 | DIASTOLIC BLOOD PRESSURE: 82 MMHG | WEIGHT: 144.6 LBS | OXYGEN SATURATION: 93 % | HEART RATE: 69 BPM

## 2024-09-18 DIAGNOSIS — R42 VERTIGO: ICD-10-CM

## 2024-09-18 DIAGNOSIS — J30.9 ALLERGIC RHINITIS, UNSPECIFIED: ICD-10-CM

## 2024-09-18 DIAGNOSIS — F43.9 SITUATIONAL STRESS: ICD-10-CM

## 2024-09-18 DIAGNOSIS — I10 PRIMARY HYPERTENSION: Primary | ICD-10-CM

## 2024-09-18 PROBLEM — R74.8 ELEVATED LIPASE: Status: RESOLVED | Noted: 2024-07-31 | Resolved: 2024-09-18

## 2024-09-18 PROBLEM — R79.89 ELEVATED SERUM CREATININE: Status: RESOLVED | Noted: 2024-07-31 | Resolved: 2024-09-18

## 2024-09-18 PROBLEM — R74.8 ELEVATED LIVER ENZYMES: Status: RESOLVED | Noted: 2024-07-31 | Resolved: 2024-09-18
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
## 2024-09-18 PROBLEM — S06.0X1A CONCUSSION WTH LOSS OF CONSCIOUSNESS OF 30 MINUTES OR LESS: Status: RESOLVED | Noted: 2024-08-05 | Resolved: 2024-09-18

## 2024-09-18 PROCEDURE — 3079F DIAST BP 80-89 MM HG: CPT | Performed by: FAMILY MEDICINE

## 2024-09-18 PROCEDURE — 1123F ACP DISCUSS/DSCN MKR DOCD: CPT | Performed by: FAMILY MEDICINE

## 2024-09-18 PROCEDURE — 3077F SYST BP >= 140 MM HG: CPT | Performed by: FAMILY MEDICINE

## 2024-09-18 PROCEDURE — 1036F TOBACCO NON-USER: CPT | Performed by: FAMILY MEDICINE

## 2024-09-18 PROCEDURE — 90662 IIV NO PRSV INCREASED AG IM: CPT | Performed by: FAMILY MEDICINE

## 2024-09-18 PROCEDURE — 1159F MED LIST DOCD IN RCRD: CPT | Performed by: FAMILY MEDICINE

## 2024-09-18 PROCEDURE — 99214 OFFICE O/P EST MOD 30 MIN: CPT | Performed by: FAMILY MEDICINE

## 2024-09-18 PROCEDURE — G0008 ADMIN INFLUENZA VIRUS VAC: HCPCS | Performed by: FAMILY MEDICINE

## 2024-09-18 RX ORDER — CETIRIZINE HYDROCHLORIDE 10 MG/1
10 TABLET ORAL DAILY
Qty: 90 TABLET | Refills: 3 | Status: SHIPPED | OUTPATIENT
Start: 2024-09-18

## 2024-09-18 RX ORDER — SERTRALINE HYDROCHLORIDE 25 MG/1
25 TABLET, FILM COATED ORAL DAILY
Qty: 30 TABLET | Refills: 1 | Status: SHIPPED | OUTPATIENT
Start: 2024-09-18 | End: 2024-11-17

## 2024-09-18 ASSESSMENT — ANXIETY QUESTIONNAIRES
IF YOU CHECKED OFF ANY PROBLEMS ON THIS QUESTIONNAIRE, HOW DIFFICULT HAVE THESE PROBLEMS MADE IT FOR YOU TO DO YOUR WORK, TAKE CARE OF THINGS AT HOME, OR GET ALONG WITH OTHER PEOPLE: NOT DIFFICULT AT ALL
1. FEELING NERVOUS, ANXIOUS, OR ON EDGE: NOT AT ALL
5. BEING SO RESTLESS THAT IT IS HARD TO SIT STILL: NOT AT ALL
2. NOT BEING ABLE TO STOP OR CONTROL WORRYING: SEVERAL DAYS
7. FEELING AFRAID AS IF SOMETHING AWFUL MIGHT HAPPEN: NOT AT ALL
GAD7 TOTAL SCORE: 3
6. BECOMING EASILY ANNOYED OR IRRITABLE: NOT AT ALL
4. TROUBLE RELAXING: SEVERAL DAYS
3. WORRYING TOO MUCH ABOUT DIFFERENT THINGS: SEVERAL DAYS

## 2024-09-18 ASSESSMENT — PATIENT HEALTH QUESTIONNAIRE - PHQ9
1. LITTLE INTEREST OR PLEASURE IN DOING THINGS: MORE THAN HALF THE DAYS
6. FEELING BAD ABOUT YOURSELF - OR THAT YOU ARE A FAILURE OR HAVE LET YOURSELF OR YOUR FAMILY DOWN: SEVERAL DAYS
5. POOR APPETITE OR OVEREATING: SEVERAL DAYS
8. MOVING OR SPEAKING SO SLOWLY THAT OTHER PEOPLE COULD HAVE NOTICED. OR THE OPPOSITE, BEING SO FIGETY OR RESTLESS THAT YOU HAVE BEEN MOVING AROUND A LOT MORE THAN USUAL: NOT AT ALL
2. FEELING DOWN, DEPRESSED OR HOPELESS: SEVERAL DAYS
9. THOUGHTS THAT YOU WOULD BE BETTER OFF DEAD, OR OF HURTING YOURSELF: NOT AT ALL
4. FEELING TIRED OR HAVING LITTLE ENERGY: SEVERAL DAYS
7. TROUBLE CONCENTRATING ON THINGS, SUCH AS READING THE NEWSPAPER OR WATCHING TELEVISION: NOT AT ALL
3. TROUBLE FALLING OR STAYING ASLEEP OR SLEEPING TOO MUCH: SEVERAL DAYS
SUM OF ALL RESPONSES TO PHQ9 QUESTIONS 1 AND 2: 3
SUM OF ALL RESPONSES TO PHQ QUESTIONS 1-9: 7
10. IF YOU CHECKED OFF ANY PROBLEMS, HOW DIFFICULT HAVE THESE PROBLEMS MADE IT FOR YOU TO DO YOUR WORK, TAKE CARE OF THINGS AT HOME, OR GET ALONG WITH OTHER PEOPLE: SOMEWHAT DIFFICULT

## 2024-09-18 ASSESSMENT — ENCOUNTER SYMPTOMS: NERVOUS/ANXIOUS: 1

## 2024-09-18 NOTE — PROGRESS NOTES
Subjective     Patient ID: Deepa Harrell is a 84 y.o. female who presents for follow up of chronic medical conditions.     With daughter today, cesar.  Per patient she is doing good.   Vertigo is gone, PT helped.     Discuss Zoloft, will need a refill.     BP med were cut in half due to the dizziness       She was told that her vertigo was most likely caused by low blood pressure so her lisinopril dose was cut in half. She has been doing much better with no new episodes. Her daughter brought her log of home blood pressure readings, they have remained stable on the new dose.  Sertraline is working well for her, she has been dealing with a lot recently but her daughter agrees her mood has been much more stable. They will be moving to an assisted living home soon, this is also stressful for her. She is not interested in increasing her dose at this time.   She will be having a MRI of her pelvis on Friday.         Review of Systems   Psychiatric/Behavioral:  The patient is nervous/anxious (stress).    All other systems reviewed and are negative.      Objective   /82 (BP Location: Left arm, Patient Position: Sitting, BP Cuff Size: Adult)   Pulse 69   Temp 36.4 °C (97.6 °F) (Temporal)   Resp 14   Wt 65.6 kg (144 lb 9.6 oz)   LMP  (LMP Unknown)   SpO2 93%   BMI 27.78 kg/m²     Physical Exam  Constitutional:       Appearance: Normal appearance. She is normal weight.   HENT:      Head: Normocephalic.   Eyes:      Conjunctiva/sclera: Conjunctivae normal.   Cardiovascular:      Rate and Rhythm: Normal rate and regular rhythm.      Pulses: Normal pulses.      Heart sounds: Normal heart sounds.   Pulmonary:      Effort: Pulmonary effort is normal.      Breath sounds: Normal breath sounds.   Musculoskeletal:      Cervical back: Normal range of motion.   Neurological:      General: No focal deficit present.      Mental Status: She is alert.   Psychiatric:         Mood and Affect: Mood normal.         Behavior: Behavior  normal.         Thought Content: Thought content normal.         Judgment: Judgment normal.         Assessment/Plan   Problem List Items Addressed This Visit       Primary hypertension - Primary     Blood pressure in office today was   BP Readings from Last 1 Encounters:   09/18/24 141/82   The goal range for blood pressure is below 130/80.   We will continue to monitor.  Continue lisinopril 5 mg daily.    10 mg dose to much, caused dizziness/vertigo  Continue reduced dose of 5mg daily   HOME BP good          Situational stress     Stable.  Sertraline working well         Relevant Medications    sertraline (Zoloft) 25 mg tablet    Vertigo     PT worked well.  Lisinopril decreased so BP does not get low and cause dizziness          Other Visit Diagnoses       Allergic rhinitis, unspecified        Relevant Medications    cetirizine (ZyrTEC) 10 mg tablet          Follow up: Scheduled 4/10/2025 and in 8 weeks    Scribe Attestation  By signing my name below, IRica Scribe   attest that this documentation has been prepared under the direction and in the presence of Saji Castro MD.

## 2024-09-20 ENCOUNTER — HOSPITAL ENCOUNTER (OUTPATIENT)
Dept: RADIOLOGY | Facility: CLINIC | Age: 84
Discharge: HOME | End: 2024-09-20
Payer: MEDICARE

## 2024-09-20 DIAGNOSIS — M89.9 LESION OF PELVIC BONE: ICD-10-CM

## 2024-09-20 PROCEDURE — 72197 MRI PELVIS W/O & W/DYE: CPT

## 2024-09-20 PROCEDURE — 2550000001 HC RX 255 CONTRASTS: Performed by: FAMILY MEDICINE

## 2024-09-20 PROCEDURE — A9575 INJ GADOTERATE MEGLUMI 0.1ML: HCPCS | Performed by: FAMILY MEDICINE

## 2024-09-20 RX ORDER — GADOTERATE MEGLUMINE 376.9 MG/ML
13 INJECTION INTRAVENOUS
Status: COMPLETED | OUTPATIENT
Start: 2024-09-20 | End: 2024-09-20

## 2024-09-20 ASSESSMENT — ENCOUNTER SYMPTOMS
OCCASIONAL FEELINGS OF UNSTEADINESS: 0
DEPRESSION: 0
LOSS OF SENSATION IN FEET: 0

## 2024-10-02 ENCOUNTER — APPOINTMENT (OUTPATIENT)
Dept: OTOLARYNGOLOGY | Facility: CLINIC | Age: 84
End: 2024-10-02
Payer: MEDICARE

## 2024-10-17 ENCOUNTER — APPOINTMENT (OUTPATIENT)
Dept: AUDIOLOGY | Facility: CLINIC | Age: 84
End: 2024-10-17
Payer: MEDICARE

## 2024-10-28 DIAGNOSIS — F43.9 SITUATIONAL STRESS: ICD-10-CM

## 2024-10-28 RX ORDER — SERTRALINE HYDROCHLORIDE 25 MG/1
25 TABLET, FILM COATED ORAL DAILY
Qty: 30 TABLET | Refills: 1 | Status: SHIPPED | OUTPATIENT
Start: 2024-10-28 | End: 2024-12-27

## 2024-10-29 ENCOUNTER — APPOINTMENT (OUTPATIENT)
Dept: OTOLARYNGOLOGY | Facility: CLINIC | Age: 84
End: 2024-10-29
Payer: MEDICARE

## 2024-10-29 VITALS — WEIGHT: 140 LBS | BODY MASS INDEX: 27.48 KG/M2 | HEIGHT: 60 IN

## 2024-10-29 DIAGNOSIS — J30.1 SEASONAL ALLERGIC RHINITIS DUE TO POLLEN: ICD-10-CM

## 2024-10-29 DIAGNOSIS — H93.293 AUDITORY DISCRIMINATION IMPAIRMENT, BILATERAL: ICD-10-CM

## 2024-10-29 DIAGNOSIS — H90.3 SENSORINEURAL HEARING LOSS, ASYMMETRICAL: Primary | ICD-10-CM

## 2024-10-29 DIAGNOSIS — H61.23 BILATERAL IMPACTED CERUMEN: ICD-10-CM

## 2024-10-29 PROCEDURE — 1159F MED LIST DOCD IN RCRD: CPT | Performed by: OTOLARYNGOLOGY

## 2024-10-29 PROCEDURE — 1123F ACP DISCUSS/DSCN MKR DOCD: CPT | Performed by: OTOLARYNGOLOGY

## 2024-10-29 PROCEDURE — 69210 REMOVE IMPACTED EAR WAX UNI: CPT | Performed by: OTOLARYNGOLOGY

## 2024-10-29 PROCEDURE — 1160F RVW MEDS BY RX/DR IN RCRD: CPT | Performed by: OTOLARYNGOLOGY

## 2024-10-29 PROCEDURE — 1036F TOBACCO NON-USER: CPT | Performed by: OTOLARYNGOLOGY

## 2024-10-29 PROCEDURE — 99213 OFFICE O/P EST LOW 20 MIN: CPT | Performed by: OTOLARYNGOLOGY

## 2024-11-07 ENCOUNTER — APPOINTMENT (OUTPATIENT)
Dept: AUDIOLOGY | Facility: CLINIC | Age: 84
End: 2024-11-07
Payer: MEDICARE

## 2024-11-07 ENCOUNTER — CLINICAL SUPPORT (OUTPATIENT)
Dept: AUDIOLOGY | Facility: CLINIC | Age: 84
End: 2024-11-07
Payer: MEDICARE

## 2024-11-07 DIAGNOSIS — H90.3 SENSORINEURAL HEARING LOSS (SNHL) OF BOTH EARS: Primary | ICD-10-CM

## 2024-11-07 PROCEDURE — V5267 HEARING AID SUP/ACCESS/DEV: HCPCS | Performed by: AUDIOLOGIST

## 2024-11-07 NOTE — PROGRESS NOTES
[facility]  HEARING AID CHECK      Name:  Deepa Harrell  YOB: 1940  Today's date:  11/07/24      PATIENT ISSUES/CONCERNS AND OTOSCOPIC RESULTS  Otoscopic was clear.  Patient reports that she would like a little more volume.    HEARING AID INSPECTION AND ADJUSTMENT  Hearing aids were cleaned and checked.  Retubed earmolds and replaced earhooks.  Listening check was good.  Analyzer data good.      Programmed to the most recent hearing test.  Decreased left aid slightly and increased right aid slightly for clarity.  Good subjective audibility noted in the office.    Again, briefly discussed a cochlear implant.    FOLLOW UP   The patient was asked to contact the office if they notice any significant change in hearing level or hearing aid function.    Otherwise, will follow up again in 6 months.    APPOINTMENT TIME  3:00pm-3:30pm    Melanie Simon  Doctor of Audiology  Senior Audiologist

## 2024-12-04 ENCOUNTER — APPOINTMENT (OUTPATIENT)
Dept: PRIMARY CARE | Facility: CLINIC | Age: 84
End: 2024-12-04
Payer: MEDICARE

## 2024-12-04 VITALS
SYSTOLIC BLOOD PRESSURE: 119 MMHG | OXYGEN SATURATION: 95 % | WEIGHT: 146.2 LBS | RESPIRATION RATE: 12 BRPM | HEART RATE: 75 BPM | BODY MASS INDEX: 28.55 KG/M2 | TEMPERATURE: 98.4 F | DIASTOLIC BLOOD PRESSURE: 70 MMHG

## 2024-12-04 DIAGNOSIS — R42 VERTIGO: Primary | ICD-10-CM

## 2024-12-04 DIAGNOSIS — F43.9 SITUATIONAL STRESS: ICD-10-CM

## 2024-12-04 PROCEDURE — 99214 OFFICE O/P EST MOD 30 MIN: CPT | Performed by: FAMILY MEDICINE

## 2024-12-04 PROCEDURE — 1123F ACP DISCUSS/DSCN MKR DOCD: CPT | Performed by: FAMILY MEDICINE

## 2024-12-04 PROCEDURE — 3074F SYST BP LT 130 MM HG: CPT | Performed by: FAMILY MEDICINE

## 2024-12-04 PROCEDURE — 3078F DIAST BP <80 MM HG: CPT | Performed by: FAMILY MEDICINE

## 2024-12-04 PROCEDURE — 1160F RVW MEDS BY RX/DR IN RCRD: CPT | Performed by: FAMILY MEDICINE

## 2024-12-04 PROCEDURE — 1159F MED LIST DOCD IN RCRD: CPT | Performed by: FAMILY MEDICINE

## 2024-12-04 PROCEDURE — 1036F TOBACCO NON-USER: CPT | Performed by: FAMILY MEDICINE

## 2024-12-04 ASSESSMENT — ENCOUNTER SYMPTOMS
MUSCULOSKELETAL NEGATIVE: 1
NERVOUS/ANXIOUS: 1
GASTROINTESTINAL NEGATIVE: 1
NEUROLOGICAL NEGATIVE: 1
CARDIOVASCULAR NEGATIVE: 1
RESPIRATORY NEGATIVE: 1
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
## 2024-12-04 NOTE — ASSESSMENT & PLAN NOTE
Recent episode last weekend, no others reported.  Dghtr trained on exercises to alleviate sxs.  Avoid sudden head turns or looking up or down  Lie down to bed gradually  May want to avoid lying on right side when sleeping as may trigger symptoms
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
## 2024-12-15 DIAGNOSIS — J20.9 ACUTE BRONCHITIS, UNSPECIFIED ORGANISM: Primary | ICD-10-CM

## 2024-12-15 RX ORDER — DOXYCYCLINE 100 MG/1
100 CAPSULE ORAL 2 TIMES DAILY
Qty: 20 CAPSULE | Refills: 0 | Status: SHIPPED | OUTPATIENT
Start: 2024-12-15 | End: 2024-12-25

## 2024-12-15 RX ORDER — DOXYCYCLINE 100 MG/1
100 CAPSULE ORAL 2 TIMES DAILY
Qty: 20 CAPSULE | Refills: 0 | Status: SHIPPED | OUTPATIENT
Start: 2024-12-15 | End: 2024-12-15 | Stop reason: ENTERED-IN-ERROR

## 2024-12-15 RX ORDER — PREDNISONE 10 MG/1
10 TABLET ORAL 2 TIMES DAILY
Qty: 10 TABLET | Refills: 0 | Status: SHIPPED | OUTPATIENT
Start: 2024-12-15 | End: 2024-12-20

## 2024-12-15 NOTE — PROGRESS NOTES
The patient has been coughing for the past 5 days,  worsening, no fever, no short of breath with rest, some production, using over the counter Mucinex with some relief,   Not improving, possibly worsening, drinking fluids and eating,    Lungs : upper airway rhonchi,  good air exchange in the bases,    Pulse Ox 97%    A/P      Acute Bronchitis    Doxy 100 mg bid for 10 days  Prednisone 10 mg bid for 5 days  OTC Mucinex   Call if worsen

## 2025-01-14 ENCOUNTER — PATIENT MESSAGE (OUTPATIENT)
Dept: PRIMARY CARE | Facility: CLINIC | Age: 85
End: 2025-01-14
Payer: MEDICARE

## 2025-01-14 DIAGNOSIS — E03.9 HYPOTHYROIDISM (ACQUIRED): ICD-10-CM

## 2025-01-14 DIAGNOSIS — J30.9 ALLERGIC RHINITIS, UNSPECIFIED: ICD-10-CM

## 2025-01-14 DIAGNOSIS — E78.2 MIXED HYPERLIPIDEMIA: ICD-10-CM

## 2025-01-14 DIAGNOSIS — F43.9 SITUATIONAL STRESS: ICD-10-CM

## 2025-01-14 DIAGNOSIS — I10 PRIMARY HYPERTENSION: ICD-10-CM

## 2025-01-14 RX ORDER — SERTRALINE HYDROCHLORIDE 25 MG/1
25 TABLET, FILM COATED ORAL DAILY
Qty: 30 TABLET | Refills: 1 | Status: SHIPPED | OUTPATIENT
Start: 2025-01-14 | End: 2025-03-15

## 2025-01-14 RX ORDER — CETIRIZINE HYDROCHLORIDE 10 MG/1
10 TABLET ORAL DAILY
Qty: 90 TABLET | Refills: 3 | Status: SHIPPED | OUTPATIENT
Start: 2025-01-14

## 2025-01-14 RX ORDER — LEVOTHYROXINE SODIUM 50 UG/1
50 TABLET ORAL DAILY
Qty: 90 TABLET | Refills: 3 | Status: SHIPPED | OUTPATIENT
Start: 2025-01-14

## 2025-01-14 RX ORDER — ATORVASTATIN CALCIUM 20 MG/1
20 TABLET, FILM COATED ORAL DAILY
Qty: 90 TABLET | Refills: 3 | Status: SHIPPED | OUTPATIENT
Start: 2025-01-14

## 2025-01-14 RX ORDER — LISINOPRIL 5 MG/1
5 TABLET ORAL DAILY
Qty: 90 TABLET | Refills: 3 | Status: SHIPPED | OUTPATIENT
Start: 2025-01-14 | End: 2026-01-14

## 2025-03-22 DIAGNOSIS — F43.9 SITUATIONAL STRESS: ICD-10-CM

## 2025-03-24 RX ORDER — SERTRALINE HYDROCHLORIDE 25 MG/1
25 TABLET, FILM COATED ORAL DAILY
Qty: 30 TABLET | Refills: 1 | Status: SHIPPED | OUTPATIENT
Start: 2025-03-24

## 2025-04-07 DIAGNOSIS — F43.9 SITUATIONAL STRESS: ICD-10-CM

## 2025-04-08 RX ORDER — SERTRALINE HYDROCHLORIDE 25 MG/1
25 TABLET, FILM COATED ORAL DAILY
Qty: 90 TABLET | Refills: 1 | Status: SHIPPED | OUTPATIENT
Start: 2025-04-08

## 2025-04-10 ENCOUNTER — APPOINTMENT (OUTPATIENT)
Dept: PRIMARY CARE | Facility: CLINIC | Age: 85
End: 2025-04-10
Payer: MEDICARE

## 2025-05-08 ENCOUNTER — APPOINTMENT (OUTPATIENT)
Dept: AUDIOLOGY | Facility: CLINIC | Age: 85
End: 2025-05-08
Payer: MEDICARE

## 2025-05-08 DIAGNOSIS — H90.3 SENSORINEURAL HEARING LOSS (SNHL) OF BOTH EARS: Primary | ICD-10-CM

## 2025-05-08 PROCEDURE — V5267 HEARING AID SUP/ACCESS/DEV: HCPCS | Performed by: AUDIOLOGIST

## 2025-05-08 NOTE — PROGRESS NOTES
[facility]  6 MONTH HEARING AID CHECK      Name:  Deepa Harrell  YOB: 1940  Today's date:  05/08/25      PATIENT ISSUES/CONCERNS AND OTOSCOPIC RESULTS  Otoscopic was mostly clear.  Patient reports that the hearing aids have been working as well as expected given her discrimination difficulties.    HEARING AID INSPECTION AND ADJUSTMENT  Hearing aids were cleaned and checked.  Retubed earmolds.  Listening check was good.      Patient reports no new issues or concerns.  No need for adjustment today.    Briefly discussed updates in hearing aid technology and again discussed cochlear implants.  Provided the patient with literature for both.    FOLLOW UP   The patient was asked to contact the office if they notice any significant change in hearing level or hearing aid function.    Otherwise, will follow up again:  6 months, 60 minute Audio EPV with Stefani    CHARGES  $25.00 office visit, simple ()    APPOINTMENT TIME  11:30am-12:00pm    Melanie Simon  Doctor of Audiology  Senior Audiologist

## 2025-05-09 NOTE — PROGRESS NOTES
Subjective   Reason for Visit: Deepa Harrell is an 84 y.o. female here for a Medicare Wellness visit.          Reviewed all medications by prescribing practitioner or clinical pharmacist (such as prescriptions, OTCs, herbal therapies and supplements) and documented in the medical record.    HPI  Patient presents With daughter today, cesar.  Per patient she is doing good. She is eating well, she has gained a few pounds since her last appointment. She still exercises, she has a treadmill and there are exercise classes at the home that her and her  live in. She reports sleeping well, no concerns.     Patient reports a swelling (bump) around her mouth. It is the size of a blueberry. It has been present for months.     Patient has scaly lesion on her shin, she has swelling in her left lower extremity particularly worse at the end of the day. Recommend elevating legs when sitting and wearing compressions stockings.      Patient requests a prescription of Cipro for recurrent UTIs.     She does wear hearing aids which are working well for her. These hearing aids are 6 years old.  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 There has been discussion about cochlear implants but she does not like the idea.     Patient Care Team:  Saji Castro MD as PCP - General  Saji Castro MD as PCP - [employer] PCP  Eligio Joyner as Audiologist (Audiology)     Review of Systems   HENT: Negative.     Respiratory: Negative.     Cardiovascular: Negative.    Gastrointestinal: Negative.    Genitourinary: Negative.    Musculoskeletal: Negative.    Neurological: Negative.        Objective   Vitals:  /81 (BP Location: Left arm, Patient Position: Sitting, BP Cuff Size: Adult)   Temp 36.4 °C (97.5 °F) (Temporal)   Resp 14   Ht 1.524 m (5')   Wt 69.7 kg (153 lb 9.6 oz)   LMP  (LMP Unknown)   SpO2 97%   BMI 30.00 kg/m²       Physical Exam  Constitutional:       Appearance: Normal appearance.    HENT:      Head: Normocephalic.   Eyes:      Conjunctiva/sclera: Conjunctivae normal.   Musculoskeletal:      Cervical back: Normal range of motion.      Left lower leg: Edema present.   Neurological:      General: No focal deficit present.      Mental Status: She is alert.   Psychiatric:         Mood and Affect: Mood normal.         Behavior: Behavior normal.         Thought Content: Thought content normal.         Judgment: Judgment normal.         Assessment/Plan   Problem List Items Addressed This Visit       Mixed hyperlipidemia    Lab Results   Component Value Date    CHOL 185 04/09/2024    CHOL 184 03/30/2023    CHOL 166 03/23/2022     Lab Results   Component Value Date    HDL 65.3 04/09/2024    HDL 62.1 03/30/2023    HDL 58.9 03/23/2022     Lab Results   Component Value Date    LDLF 99 03/30/2023     Lab Results   Component Value Date    TRIG 99 04/09/2024    TRIG 116 03/30/2023    TRIG 121 03/23/2022   Stable.  Continue atorvastatin 20 mg daily and fish oil supplement.  Ordered lipid panel.         Hypothyroidism    Lab Results   Component Value Date    TSH 2.68 04/09/2024   Stable  Continue Synthroid 50 mcg daily.  Repeat TSH ordered.         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 Meyers.  Receiving injections for treatment.              Screening for hyperlipidemia      Orders:    Lipid Panel; Future         Relevant Orders    Lipid Panel     Other Visit Diagnoses         Routine general medical examination at health care facility        Relevant Orders    1 Year Follow Up In [facility] - PCP - Wellness Exam    Comprehensive Metabolic Panel      Acute cystitis without hematuria        Relevant Medications    ciprofloxacin (Cipro) 250 mg tablet          Lower extremity edema:Recommend elevating legs when sitting and wearing compressions stockings.  Discussed salt intake can make swelling worse.      Follow up: Scheduled for blood work, follow up in 6 months    Scribe Attestation  By signing my name below, I, Alexis Araiza   attest that this documentation has been prepared under the direction and in the presence of Saji Castro MD.    Scribe Attestation  By signing my name below, I Dori Alexis Stone attest that this documentation has been prepared under the direction and in the presence of Saji Castro MD. All medical record entries made by the Scribe were at my direction or personally dictated by me. I have reviewed the chart and agree that the record accurately reflects my personal performance of the history, physical exam, discussion and plan.

## 2025-05-14 ENCOUNTER — APPOINTMENT (OUTPATIENT)
Dept: PRIMARY CARE | Facility: CLINIC | Age: 85
End: 2025-05-14
Payer: MEDICARE

## 2025-05-14 VITALS
OXYGEN SATURATION: 97 % | SYSTOLIC BLOOD PRESSURE: 116 MMHG | HEIGHT: 60 IN | WEIGHT: 153.6 LBS | BODY MASS INDEX: 30.15 KG/M2 | TEMPERATURE: 97.5 F | DIASTOLIC BLOOD PRESSURE: 81 MMHG | RESPIRATION RATE: 14 BRPM

## 2025-05-14 DIAGNOSIS — H35.3122 INTERMEDIATE STAGE NONEXUDATIVE AGE-RELATED MACULAR DEGENERATION OF LEFT EYE: ICD-10-CM

## 2025-05-14 DIAGNOSIS — Z00.00 ROUTINE GENERAL MEDICAL EXAMINATION AT HEALTH CARE FACILITY: ICD-10-CM

## 2025-05-14 DIAGNOSIS — H35.3212 EXUDATIVE AGE-RELATED MACULAR DEGENERATION OF RIGHT EYE WITH INACTIVE CHOROIDAL NEOVASCULARIZATION: Primary | ICD-10-CM

## 2025-05-14 DIAGNOSIS — F43.9 SITUATIONAL STRESS: ICD-10-CM

## 2025-05-14 DIAGNOSIS — N30.00 ACUTE CYSTITIS WITHOUT HEMATURIA: ICD-10-CM

## 2025-05-14 DIAGNOSIS — H90.3 SENSORINEURAL HEARING LOSS, ASYMMETRICAL: ICD-10-CM

## 2025-05-14 DIAGNOSIS — E03.9 HYPOTHYROIDISM, UNSPECIFIED TYPE: ICD-10-CM

## 2025-05-14 DIAGNOSIS — M85.80 OSTEOPENIA, UNSPECIFIED LOCATION: ICD-10-CM

## 2025-05-14 DIAGNOSIS — Z13.220 SCREENING FOR HYPERLIPIDEMIA: ICD-10-CM

## 2025-05-14 DIAGNOSIS — H90.3 SENSORINEURAL HEARING LOSS (SNHL) OF BOTH EARS: ICD-10-CM

## 2025-05-14 DIAGNOSIS — E78.2 MIXED HYPERLIPIDEMIA: ICD-10-CM

## 2025-05-14 PROCEDURE — 3074F SYST BP LT 130 MM HG: CPT | Performed by: FAMILY MEDICINE

## 2025-05-14 PROCEDURE — 1160F RVW MEDS BY RX/DR IN RCRD: CPT | Performed by: FAMILY MEDICINE

## 2025-05-14 PROCEDURE — G0439 PPPS, SUBSEQ VISIT: HCPCS | Performed by: FAMILY MEDICINE

## 2025-05-14 PROCEDURE — 3079F DIAST BP 80-89 MM HG: CPT | Performed by: FAMILY MEDICINE

## 2025-05-14 PROCEDURE — 1170F FXNL STATUS ASSESSED: CPT | Performed by: FAMILY MEDICINE

## 2025-05-14 PROCEDURE — 1159F MED LIST DOCD IN RCRD: CPT | Performed by: FAMILY MEDICINE

## 2025-05-14 PROCEDURE — 1123F ACP DISCUSS/DSCN MKR DOCD: CPT | Performed by: FAMILY MEDICINE

## 2025-05-14 PROCEDURE — 1036F TOBACCO NON-USER: CPT | Performed by: FAMILY MEDICINE

## 2025-05-14 RX ORDER — CIPROFLOXACIN 250 MG/1
250 TABLET, FILM COATED ORAL 2 TIMES DAILY
Qty: 10 TABLET | Refills: 0 | Status: SHIPPED | OUTPATIENT
Start: 2025-05-14

## 2025-05-14 ASSESSMENT — ACTIVITIES OF DAILY LIVING (ADL)
TAKING_MEDICATION: INDEPENDENT
DRESSING: INDEPENDENT
BATHING: INDEPENDENT
GROCERY_SHOPPING: NEEDS ASSISTANCE
MANAGING_FINANCES: NEEDS ASSISTANCE
DOING_HOUSEWORK: NEEDS ASSISTANCE

## 2025-05-14 ASSESSMENT — ENCOUNTER SYMPTOMS
MUSCULOSKELETAL NEGATIVE: 1
RESPIRATORY NEGATIVE: 1
GASTROINTESTINAL NEGATIVE: 1
CARDIOVASCULAR NEGATIVE: 1
NEUROLOGICAL NEGATIVE: 1

## 2025-05-14 ASSESSMENT — PATIENT HEALTH QUESTIONNAIRE - PHQ9
1. LITTLE INTEREST OR PLEASURE IN DOING THINGS: SEVERAL DAYS
10. IF YOU CHECKED OFF ANY PROBLEMS, HOW DIFFICULT HAVE THESE PROBLEMS MADE IT FOR YOU TO DO YOUR WORK, TAKE CARE OF THINGS AT HOME, OR GET ALONG WITH OTHER PEOPLE: NOT DIFFICULT AT ALL
2. FEELING DOWN, DEPRESSED OR HOPELESS: NOT AT ALL
SUM OF ALL RESPONSES TO PHQ9 QUESTIONS 1 AND 2: 1
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
## 2025-05-14 NOTE — ASSESSMENT & PLAN NOTE
Lab Results   Component Value Date    TSH 2.68 04/09/2024   Stable  Continue Synthroid 50 mcg daily.  Repeat TSH ordered.    Orders:    TSH with reflex to Free T4 if abnormal; Future

## 2025-05-14 NOTE — ASSESSMENT & PLAN NOTE
Lab Results   Component Value Date    CHOL 185 04/09/2024    CHOL 184 03/30/2023    CHOL 166 03/23/2022     Lab Results   Component Value Date    HDL 65.3 04/09/2024    HDL 62.1 03/30/2023    HDL 58.9 03/23/2022     Lab Results   Component Value Date    LDLF 99 03/30/2023     Lab Results   Component Value Date    TRIG 99 04/09/2024    TRIG 116 03/30/2023    TRIG 121 03/23/2022   Stable.  Continue atorvastatin 20 mg daily and fish oil supplement.  Ordered lipid panel.

## 2025-05-20 PROBLEM — R42 VERTIGO: Status: RESOLVED | Noted: 2024-08-05 | Resolved: 2025-05-20

## 2025-05-20 NOTE — ASSESSMENT & PLAN NOTE
Has hearing aids  Left ear not able to be improved with amplification  Considering cochlear implant

## 2025-05-21 LAB
ALBUMIN SERPL-MCNC: 4.2 G/DL (ref 3.6–5.1)
ALP SERPL-CCNC: 164 U/L (ref 37–153)
ALT SERPL-CCNC: 22 U/L (ref 6–29)
ANION GAP SERPL CALCULATED.4IONS-SCNC: 10 MMOL/L (CALC) (ref 7–17)
AST SERPL-CCNC: 17 U/L (ref 10–35)
BILIRUB SERPL-MCNC: 0.7 MG/DL (ref 0.2–1.2)
BUN SERPL-MCNC: 20 MG/DL (ref 7–25)
CALCIUM SERPL-MCNC: 9 MG/DL (ref 8.6–10.4)
CHLORIDE SERPL-SCNC: 107 MMOL/L (ref 98–110)
CHOLEST SERPL-MCNC: 157 MG/DL
CHOLEST/HDLC SERPL: 2.6 (CALC)
CO2 SERPL-SCNC: 26 MMOL/L (ref 20–32)
CREAT SERPL-MCNC: 0.82 MG/DL (ref 0.6–0.95)
EGFRCR SERPLBLD CKD-EPI 2021: 70 ML/MIN/1.73M2
GLUCOSE SERPL-MCNC: 80 MG/DL (ref 65–99)
HDLC SERPL-MCNC: 61 MG/DL
LDLC SERPL CALC-MCNC: 80 MG/DL (CALC)
NONHDLC SERPL-MCNC: 96 MG/DL (CALC)
POTASSIUM SERPL-SCNC: 4.6 MMOL/L (ref 3.5–5.3)
PROT SERPL-MCNC: 6.3 G/DL (ref 6.1–8.1)
SODIUM SERPL-SCNC: 143 MMOL/L (ref 135–146)
TRIGL SERPL-MCNC: 81 MG/DL
TSH SERPL-ACNC: 2.31 MIU/L (ref 0.4–4.5)

## 2025-07-22 ENCOUNTER — TELEPHONE (OUTPATIENT)
Dept: OTOLARYNGOLOGY | Facility: HOSPITAL | Age: 85
End: 2025-07-22

## 2025-07-22 ENCOUNTER — CLINICAL SUPPORT (OUTPATIENT)
Dept: AUDIOLOGY | Facility: CLINIC | Age: 85
End: 2025-07-22
Payer: MEDICARE

## 2025-07-22 DIAGNOSIS — H90.3 SENSORINEURAL HEARING LOSS (SNHL) OF BOTH EARS: Primary | ICD-10-CM

## 2025-07-22 PROCEDURE — 92626 EVAL AUD FUNCJ 1ST HOUR: CPT | Mod: 59 | Performed by: AUDIOLOGIST

## 2025-07-22 PROCEDURE — 92557 COMPREHENSIVE HEARING TEST: CPT | Performed by: AUDIOLOGIST

## 2025-07-22 PROCEDURE — 92550 TYMPANOMETRY & REFLEX THRESH: CPT | Mod: 52 | Performed by: AUDIOLOGIST

## 2025-07-22 ASSESSMENT — PAIN - FUNCTIONAL ASSESSMENT: PAIN_FUNCTIONAL_ASSESSMENT: 0-10

## 2025-07-22 ASSESSMENT — PAIN SCALES - GENERAL: PAINLEVEL_OUTOF10: 0 - NO PAIN

## 2025-07-22 NOTE — PROGRESS NOTES
CHIEF COMPLAINT  Cochlear implant evaluation     HISTORY:  Ms. Deepa Harrell was seen by the Audiology Department for a Cochlear Implant evaluation and counseling. She presents as an 85-year old female with a reported history of hearing loss for the past 30-40 years. She has owned several sets of hearing aids over the past 30+ years and currently wears Phonak Paula B90-SP behind the ear (BTE) hearing aids that are approximately 5 years old.  Patient denies tinnitus, vertigo, falls, and ear pain.  Patient struggles with the telephone and with watching television, typically resorting to speakerphone and closed captioning for television. She relies heavily on lipreading for communication, and finds herself opting out of social settings due to listening fatigue.  Patient and her  and daughter are here today to learn more about cochlear implants and to determine if she is a candidate. Please refer to medical records for significant medical history.    RESULTS:  Otoscopy revealed essentially clear canals bilaterally. Immittance testing revealed normal middle ear function bilaterally. Acoustic reflexes were absent at 500-4000 Hz bilaterally. Distortion product otoacoustic emissions (DPOAE's) were absent for 6338-5113 Hz bilaterally. Responses to pure tone stimuli under insert earphones reveal a moderate sloping to profound sensorineural hearing loss bilaterally. Word discrimination is poor bilaterally. Left ear worse than right ear for thresholds and discrimination.    Functional gain testing was performed while patient wore clinic loaner Phonak Paula P90-UP power BTE behind the ear hearing aids at each ear. Responses to narrow band noise ranged from 35-85 dBHL for the frequencies of 500-4000 Hz. Word discrimination was assessed using a 25 word CNC  list as well as [employer] sentences presented at 50 dBHL. In the right ear, a score of 36% correct was obtained for CNC  words and a score of 56% correct was obtained for [employer]  sentences. In the left ear a score of 0% correct was obtained for CNC words and a score of 0% correct was obtained for [employer] sentences. [employer] sentences were also presented with 40 dBHL competing speech babble. In this condition, a score of 22% correct was obtained for the right ear and a score of 0% was obtained for the left ear. All speech material was presented via recorded text.     The Cochlear Implant Quality of Life-35 (CIQOL-35) Profile was administered today. Patients responses gave the following raw scores and converted scores:  Communication = 26 raw, 41.07 converted  Emotional = 16 raw, 55.56 converted  Entertainment = 14 raw, 42.05 converted  Environment = 14 raw, 41.53 converted  Listening Effort = 11 raw, 31.73 converted  Social = 14 raw, 45.79 converted  CIQOL-10 Global 28 raw, 43.62 converted    DISCUSSION:  Ms. Harrell has a well-documented moderate to profound sensorineural hearing loss bilaterally. Due to the lack of benefit from appropriate amplification, she has expressed interest in a cochlear implant.  Testing shows poor sentence and word understanding, even with appropriately programmed hearing aids indicating a severe to profound functional impairment.  The audiologic findings demonstrate that Ms. Harrell has partial residual hearing for tonal stimuli and speech detection with hearing aids, demonstrating that the auditory cranial nerve can be stimulated. However, hearing aids are not strong enough to provide benefit for speech understanding due to the severity of the hearing loss. On this basis, patient is judged to be an audiologic candidate for a cochlear implant.  Patient/family have been counseled regarding the post-operative cochlear implant protocol and are properly motivated and able to participate in the post cochlear implant rehabilitation program. Reasonable Expectations and Cochlear Implant Enrollment check sheet were discussed and completed with the patient/family. They have been given  written information regarding cochlear implant candidacy and protocol. Physical device examples were demonstrated today.  Final device selection was not completed today, as patient wanted to look over materials.             TREATMENT PLAN:  1. Follow-up with ENT regarding test results and for surgical consultation.  2. From an audiologic standpoint, patient is a candidate for a cochlear implant.  3. Further recommendations following surgical consult.      [id number]    Melanie Carrera, CCC-A

## 2025-07-23 DIAGNOSIS — E03.9 HYPOTHYROIDISM (ACQUIRED): ICD-10-CM

## 2025-07-23 RX ORDER — LEVOTHYROXINE SODIUM 50 UG/1
50 TABLET ORAL DAILY
Qty: 90 TABLET | Refills: 3 | Status: SHIPPED | OUTPATIENT
Start: 2025-07-23

## 2025-07-24 ENCOUNTER — TELEPHONE (OUTPATIENT)
Dept: OTOLARYNGOLOGY | Facility: HOSPITAL | Age: 85
End: 2025-07-24
Payer: MEDICARE

## 2025-07-24 NOTE — TELEPHONE ENCOUNTER
Called patient regarding appointment request for cochlear implant consultation. Left message for patient to contact me directly for scheduling.

## 2025-08-26 ENCOUNTER — APPOINTMENT (OUTPATIENT)
Dept: OTOLARYNGOLOGY | Facility: CLINIC | Age: 85
End: 2025-08-26
Payer: MEDICARE

## 2025-08-26 DIAGNOSIS — H90.3 SENSORINEURAL HEARING LOSS (SNHL) OF BOTH EARS: Primary | ICD-10-CM

## 2025-08-26 PROCEDURE — 1159F MED LIST DOCD IN RCRD: CPT | Performed by: OTOLARYNGOLOGY

## 2025-08-26 PROCEDURE — 1160F RVW MEDS BY RX/DR IN RCRD: CPT | Performed by: OTOLARYNGOLOGY

## 2025-08-26 PROCEDURE — 99204 OFFICE O/P NEW MOD 45 MIN: CPT | Performed by: OTOLARYNGOLOGY

## 2025-08-26 ASSESSMENT — PATIENT HEALTH QUESTIONNAIRE - PHQ9
1. LITTLE INTEREST OR PLEASURE IN DOING THINGS: NOT AT ALL
SUM OF ALL RESPONSES TO PHQ9 QUESTIONS 1 AND 2: 0
2. FEELING DOWN, DEPRESSED OR HOPELESS: NOT AT ALL

## 2025-08-27 ENCOUNTER — TELEPHONE (OUTPATIENT)
Dept: OTOLARYNGOLOGY | Facility: HOSPITAL | Age: 85
End: 2025-08-27
Payer: MEDICARE

## 2025-08-30 RX ORDER — PNEUMOCOCCAL 20-VALENT CONJUGATE VACCINE 2.2; 2.2; 2.2; 2.2; 2.2; 2.2; 2.2; 2.2; 2.2; 2.2; 2.2; 2.2; 2.2; 2.2; 2.2; 2.2; 4.4; 2.2; 2.2; 2.2 UG/.5ML; UG/.5ML; UG/.5ML; UG/.5ML; UG/.5ML; UG/.5ML; UG/.5ML; UG/.5ML; UG/.5ML; UG/.5ML; UG/.5ML; UG/.5ML; UG/.5ML; UG/.5ML; UG/.5ML; UG/.5ML; UG/.5ML; UG/.5ML; UG/.5ML; UG/.5ML
0.5 INJECTION, SUSPENSION INTRAMUSCULAR ONCE
Qty: 0.5 ML | Refills: 0 | Status: SHIPPED | OUTPATIENT
Start: 2025-08-30 | End: 2025-08-30

## 2025-10-30 ENCOUNTER — APPOINTMENT (OUTPATIENT)
Dept: OTOLARYNGOLOGY | Facility: CLINIC | Age: 85
End: 2025-10-30
Payer: MEDICARE

## 2025-10-30 ENCOUNTER — APPOINTMENT (OUTPATIENT)
Dept: AUDIOLOGY | Facility: CLINIC | Age: 85
End: 2025-10-30
Payer: MEDICARE

## 2025-11-11 ENCOUNTER — APPOINTMENT (OUTPATIENT)
Dept: AUDIOLOGY | Facility: CLINIC | Age: 85
End: 2025-11-11
Payer: MEDICARE

## 2025-11-12 ENCOUNTER — APPOINTMENT (OUTPATIENT)
Dept: PRIMARY CARE | Facility: CLINIC | Age: 85
End: 2025-11-12
Payer: MEDICARE